# Patient Record
Sex: MALE | Race: WHITE | NOT HISPANIC OR LATINO | Employment: FULL TIME | URBAN - METROPOLITAN AREA
[De-identification: names, ages, dates, MRNs, and addresses within clinical notes are randomized per-mention and may not be internally consistent; named-entity substitution may affect disease eponyms.]

---

## 2017-02-22 ENCOUNTER — OFFICE VISIT - HEALTHEAST (OUTPATIENT)
Dept: FAMILY MEDICINE | Facility: CLINIC | Age: 37
End: 2017-02-22

## 2017-02-22 DIAGNOSIS — F32.1 MAJOR DEPRESSIVE DISORDER, SINGLE EPISODE, MODERATE (H): ICD-10-CM

## 2017-02-22 DIAGNOSIS — E78.00 PURE HYPERCHOLESTEROLEMIA: ICD-10-CM

## 2017-02-22 DIAGNOSIS — M54.32 SCIATICA OF LEFT SIDE: ICD-10-CM

## 2017-02-22 LAB
ALT SERPL W P-5'-P-CCNC: 23 U/L (ref 0–45)
CHOLEST SERPL-MCNC: 203 MG/DL
FASTING STATUS PATIENT QL REPORTED: NO
HDLC SERPL-MCNC: 27 MG/DL

## 2017-02-22 ASSESSMENT — MIFFLIN-ST. JEOR: SCORE: 2174.31

## 2017-02-23 ENCOUNTER — COMMUNICATION - HEALTHEAST (OUTPATIENT)
Dept: FAMILY MEDICINE | Facility: CLINIC | Age: 37
End: 2017-02-23

## 2017-06-26 ENCOUNTER — COMMUNICATION - HEALTHEAST (OUTPATIENT)
Dept: FAMILY MEDICINE | Facility: CLINIC | Age: 37
End: 2017-06-26

## 2017-06-26 DIAGNOSIS — M54.30 SCIATICA: ICD-10-CM

## 2017-08-10 ENCOUNTER — OFFICE VISIT - HEALTHEAST (OUTPATIENT)
Dept: FAMILY MEDICINE | Facility: CLINIC | Age: 37
End: 2017-08-10

## 2017-08-10 DIAGNOSIS — F32.1 MAJOR DEPRESSIVE DISORDER, SINGLE EPISODE, MODERATE (H): ICD-10-CM

## 2017-08-10 RX ORDER — FEXOFENADINE HCL 180 MG/1
180 TABLET ORAL DAILY
Status: SHIPPED | COMMUNITY
Start: 2017-08-10 | End: 2021-12-16

## 2017-08-15 ENCOUNTER — COMMUNICATION - HEALTHEAST (OUTPATIENT)
Dept: FAMILY MEDICINE | Facility: CLINIC | Age: 37
End: 2017-08-15

## 2017-08-16 ENCOUNTER — AMBULATORY - HEALTHEAST (OUTPATIENT)
Dept: FAMILY MEDICINE | Facility: CLINIC | Age: 37
End: 2017-08-16

## 2017-08-16 DIAGNOSIS — M54.16 LUMBAR RADICULOPATHY: ICD-10-CM

## 2017-08-24 ENCOUNTER — COMMUNICATION - HEALTHEAST (OUTPATIENT)
Dept: FAMILY MEDICINE | Facility: CLINIC | Age: 37
End: 2017-08-24

## 2017-08-28 ENCOUNTER — HOSPITAL ENCOUNTER (OUTPATIENT)
Dept: MRI IMAGING | Facility: HOSPITAL | Age: 37
Discharge: HOME | End: 2017-08-28
Attending: FAMILY MEDICINE

## 2017-08-28 DIAGNOSIS — M54.16 LUMBAR RADICULOPATHY: ICD-10-CM

## 2017-08-30 ENCOUNTER — OFFICE VISIT - HEALTHEAST (OUTPATIENT)
Dept: FAMILY MEDICINE | Facility: CLINIC | Age: 37
End: 2017-08-30

## 2017-08-30 DIAGNOSIS — F32.1 MAJOR DEPRESSIVE DISORDER, SINGLE EPISODE, MODERATE (H): ICD-10-CM

## 2017-08-30 DIAGNOSIS — M54.32 SCIATICA OF LEFT SIDE: ICD-10-CM

## 2017-08-30 ASSESSMENT — MIFFLIN-ST. JEOR: SCORE: 2224.2

## 2018-04-03 ENCOUNTER — COMMUNICATION - HEALTHEAST (OUTPATIENT)
Dept: FAMILY MEDICINE | Facility: CLINIC | Age: 38
End: 2018-04-03

## 2018-04-11 ENCOUNTER — OFFICE VISIT - HEALTHEAST (OUTPATIENT)
Dept: FAMILY MEDICINE | Facility: CLINIC | Age: 38
End: 2018-04-11

## 2018-04-11 DIAGNOSIS — F32.1 MAJOR DEPRESSIVE DISORDER, SINGLE EPISODE, MODERATE (H): ICD-10-CM

## 2018-04-11 NOTE — ASSESSMENT & PLAN NOTE
Doing well on 150 mg venlafaxine.  PHQ 9 = 3  Patient's impression is that the medication is helped a lot.  He is happy to stay on it.    This is the first time is been treated with medication.  He has not had recurrent depression but believes that he had symptoms that preceded presentation this time by quite some time.    Continue medication, follow-up 1 year, discussed potential discontinuation of medication.

## 2018-10-02 ENCOUNTER — COMMUNICATION - HEALTHEAST (OUTPATIENT)
Dept: FAMILY MEDICINE | Facility: CLINIC | Age: 38
End: 2018-10-02

## 2019-02-28 ENCOUNTER — COMMUNICATION - HEALTHEAST (OUTPATIENT)
Dept: FAMILY MEDICINE | Facility: CLINIC | Age: 39
End: 2019-02-28

## 2019-03-04 ENCOUNTER — COMMUNICATION - HEALTHEAST (OUTPATIENT)
Dept: FAMILY MEDICINE | Facility: CLINIC | Age: 39
End: 2019-03-04

## 2019-03-13 ENCOUNTER — OFFICE VISIT - HEALTHEAST (OUTPATIENT)
Dept: FAMILY MEDICINE | Facility: CLINIC | Age: 39
End: 2019-03-13

## 2019-03-13 DIAGNOSIS — E66.812 CLASS 2 OBESITY WITHOUT SERIOUS COMORBIDITY WITH BODY MASS INDEX (BMI) OF 39.0 TO 39.9 IN ADULT, UNSPECIFIED OBESITY TYPE: ICD-10-CM

## 2019-03-13 DIAGNOSIS — Z00.00 ROUTINE GENERAL MEDICAL EXAMINATION AT A HEALTH CARE FACILITY: ICD-10-CM

## 2019-03-13 DIAGNOSIS — Z00.00 HEALTHCARE MAINTENANCE: ICD-10-CM

## 2019-03-13 DIAGNOSIS — F32.1 MAJOR DEPRESSIVE DISORDER, SINGLE EPISODE, MODERATE (H): ICD-10-CM

## 2019-03-13 DIAGNOSIS — L91.8 SKIN TAG: ICD-10-CM

## 2019-03-13 DIAGNOSIS — E78.00 PURE HYPERCHOLESTEROLEMIA: ICD-10-CM

## 2019-03-13 LAB
CHOLEST SERPL-MCNC: 245 MG/DL
FASTING STATUS PATIENT QL REPORTED: YES
FASTING STATUS PATIENT QL REPORTED: YES
GLUCOSE BLD-MCNC: 93 MG/DL (ref 70–99)
HDLC SERPL-MCNC: 43 MG/DL
LDLC SERPL CALC-MCNC: 176 MG/DL
TRIGL SERPL-MCNC: 131 MG/DL
TSH SERPL DL<=0.005 MIU/L-ACNC: 1.49 UIU/ML (ref 0.3–5)

## 2019-03-13 ASSESSMENT — MIFFLIN-ST. JEOR: SCORE: 2237.81

## 2019-03-13 NOTE — ASSESSMENT & PLAN NOTE
Unchangedin remission ,  PHQ9= 2  Intervention(s):  Medication?  Venlafaxine 150  Working with therapist?  No    Comment: She has been stable now for 2 years, discussed option of stopping medication.  He has no significant side effects with this and is inclined to continue medication given risk of recurrence.For now, will continue-plan discussion in 1-2 years

## 2019-03-13 NOTE — ASSESSMENT & PLAN NOTE
Briefly discussed, encouraged healthy eating, encouraged 30 minutes/day of exercise.  Patient getting limited exercise, does walk thedog on a daily basis.

## 2019-03-13 NOTE — ASSESSMENT & PLAN NOTE
Giant and stable left groin, atypical, may be subcutaneous lipoma.  Normal epithelium and soft so very low cancer risk.  Not growing.Offered referral for resection, for now follow

## 2019-03-14 ENCOUNTER — COMMUNICATION - HEALTHEAST (OUTPATIENT)
Dept: FAMILY MEDICINE | Facility: CLINIC | Age: 39
End: 2019-03-14

## 2019-03-14 DIAGNOSIS — E78.00 PURE HYPERCHOLESTEROLEMIA: ICD-10-CM

## 2020-03-11 ENCOUNTER — COMMUNICATION - HEALTHEAST (OUTPATIENT)
Dept: FAMILY MEDICINE | Facility: CLINIC | Age: 40
End: 2020-03-11

## 2020-03-11 DIAGNOSIS — F32.1 MAJOR DEPRESSIVE DISORDER, SINGLE EPISODE, MODERATE (H): ICD-10-CM

## 2020-09-17 ENCOUNTER — OFFICE VISIT - HEALTHEAST (OUTPATIENT)
Dept: FAMILY MEDICINE | Facility: CLINIC | Age: 40
End: 2020-09-17

## 2020-09-17 DIAGNOSIS — E66.812 CLASS 2 OBESITY WITHOUT SERIOUS COMORBIDITY WITH BODY MASS INDEX (BMI) OF 39.0 TO 39.9 IN ADULT, UNSPECIFIED OBESITY TYPE: ICD-10-CM

## 2020-09-17 DIAGNOSIS — Z80.0 FAMILY HISTORY OF COLON CANCER: ICD-10-CM

## 2020-09-17 DIAGNOSIS — Z00.00 HEALTHCARE MAINTENANCE: ICD-10-CM

## 2020-09-17 DIAGNOSIS — F32.1 MAJOR DEPRESSIVE DISORDER, SINGLE EPISODE, MODERATE (H): ICD-10-CM

## 2020-09-17 DIAGNOSIS — E78.00 PURE HYPERCHOLESTEROLEMIA: ICD-10-CM

## 2020-09-17 LAB
ALT SERPL W P-5'-P-CCNC: 39 U/L (ref 0–45)
CHOLEST SERPL-MCNC: 279 MG/DL
FASTING STATUS PATIENT QL REPORTED: YES
FASTING STATUS PATIENT QL REPORTED: YES
GLUCOSE BLD-MCNC: 85 MG/DL (ref 70–99)
HDLC SERPL-MCNC: 39 MG/DL
LDLC SERPL CALC-MCNC: 211 MG/DL
TRIGL SERPL-MCNC: 145 MG/DL

## 2020-09-17 ASSESSMENT — MIFFLIN-ST. JEOR: SCORE: 2224.93

## 2020-09-17 ASSESSMENT — PATIENT HEALTH QUESTIONNAIRE - PHQ9: SUM OF ALL RESPONSES TO PHQ QUESTIONS 1-9: 8

## 2020-09-17 NOTE — ASSESSMENT & PLAN NOTE
Unchanged  worsened slightly.  Pandemic stress plus grandfather recently dying, project at work.  Before last few weeks, was having good days and then 2 or 3 bad days a month.  PHQ9= 8  Intervention(s):  Medication?  Venlafaxine 150, initially quite effective  Working with therapist?  No  Other: Patient is trying to exercise regularly    Plan: Increase venlafaxine to 225, follow blood pressure  Follow-up: As needed  In general feels that he is doing fairly well except for the few days that things are not good.  Wife had talked to him about seeing if we could increase dose of medication.

## 2020-09-17 NOTE — ASSESSMENT & PLAN NOTE
Walks with dog twice daily, wife working on improving diet, eating less fast food, weight is stable.

## 2020-09-17 NOTE — ASSESSMENT & PLAN NOTE
Grandfather, before age 40 puts him at higher risk despite being a second-degree relative, referral for colonoscopy

## 2020-09-23 ENCOUNTER — AMBULATORY - HEALTHEAST (OUTPATIENT)
Dept: FAMILY MEDICINE | Facility: CLINIC | Age: 40
End: 2020-09-23

## 2020-09-23 ENCOUNTER — COMMUNICATION - HEALTHEAST (OUTPATIENT)
Dept: FAMILY MEDICINE | Facility: CLINIC | Age: 40
End: 2020-09-23

## 2020-09-23 DIAGNOSIS — E78.00 PURE HYPERCHOLESTEROLEMIA: ICD-10-CM

## 2020-09-25 ENCOUNTER — RECORDS - HEALTHEAST (OUTPATIENT)
Dept: ADMINISTRATIVE | Facility: OTHER | Age: 40
End: 2020-09-25

## 2020-11-14 ENCOUNTER — COMMUNICATION - HEALTHEAST (OUTPATIENT)
Dept: FAMILY MEDICINE | Facility: CLINIC | Age: 40
End: 2020-11-14

## 2020-11-14 DIAGNOSIS — F32.1 MAJOR DEPRESSIVE DISORDER, SINGLE EPISODE, MODERATE (H): ICD-10-CM

## 2020-11-19 ENCOUNTER — COMMUNICATION - HEALTHEAST (OUTPATIENT)
Dept: FAMILY MEDICINE | Facility: CLINIC | Age: 40
End: 2020-11-19

## 2020-11-19 DIAGNOSIS — F32.1 MAJOR DEPRESSIVE DISORDER, SINGLE EPISODE, MODERATE (H): ICD-10-CM

## 2020-11-19 RX ORDER — VENLAFAXINE HYDROCHLORIDE 75 MG/1
225 CAPSULE, EXTENDED RELEASE ORAL DAILY
Qty: 270 CAPSULE | Refills: 3 | Status: SHIPPED | OUTPATIENT
Start: 2020-11-19 | End: 2021-11-10

## 2021-04-28 ENCOUNTER — AMBULATORY - HEALTHEAST (OUTPATIENT)
Dept: NURSING | Facility: CLINIC | Age: 41
End: 2021-04-28

## 2021-05-19 ENCOUNTER — AMBULATORY - HEALTHEAST (OUTPATIENT)
Dept: NURSING | Facility: CLINIC | Age: 41
End: 2021-05-19

## 2021-05-27 ASSESSMENT — PATIENT HEALTH QUESTIONNAIRE - PHQ9: SUM OF ALL RESPONSES TO PHQ QUESTIONS 1-9: 8

## 2021-05-30 VITALS — WEIGHT: 271 LBS | HEIGHT: 72 IN | BODY MASS INDEX: 36.7 KG/M2

## 2021-05-31 VITALS — BODY MASS INDEX: 39.06 KG/M2 | WEIGHT: 284 LBS

## 2021-05-31 VITALS — BODY MASS INDEX: 38.19 KG/M2 | WEIGHT: 282 LBS | HEIGHT: 72 IN

## 2021-06-01 VITALS — WEIGHT: 280 LBS | BODY MASS INDEX: 38.51 KG/M2

## 2021-06-02 VITALS — BODY MASS INDEX: 38.6 KG/M2 | HEIGHT: 72 IN | WEIGHT: 285 LBS

## 2021-06-04 VITALS
RESPIRATION RATE: 14 BRPM | TEMPERATURE: 98.9 F | WEIGHT: 283 LBS | HEIGHT: 71 IN | BODY MASS INDEX: 39.62 KG/M2 | SYSTOLIC BLOOD PRESSURE: 127 MMHG | HEART RATE: 88 BPM | DIASTOLIC BLOOD PRESSURE: 82 MMHG

## 2021-06-06 NOTE — TELEPHONE ENCOUNTER
3/13/19-last visit    , Medication refill requested. Please authorize medication if appropriate. Thank you.

## 2021-06-09 NOTE — PROGRESS NOTES
.  Assessment/ Plan  1. Major depressive disorder, single episode, moderate  Total of over 25 minutes spent with this visit, greater than 50% of this counseling  Reviewed Lexapro 20 mg, will discuss duration of treatment when it has been a year on the medication, this July.  2. Sciatica of left side  Chronic recurrent problem.  L5 radiculopathy per patient.  Then pretty good up until one month ago.  Requesting gabapentin which was effective in the past.  No back pain.  Received epidural steroids in the past which have been effective.  Has done physical therapy  3. Pure hypercholesterolemia  Adjusted diet, recheck  - Cholesterol, Total  - HDL Cholesterol  - ALT (SGPT)      Body mass index is 37.27 kg/(m^2).  The following high BMI interventions were performed this visit: encouragement to exercise  Subjective  CC:  Chief Complaint   Patient presents with     MEDICATION check.     Gabapentin      Back Pain     lower back pain. moves to the back of the thigh     HPI:  Sciatica L leg 2009  Did pt,  Down L leg.   2 surgeries- most recent 2 years ago  2 ESIs  Pain is starting to come back- 1 month, gradual onset.  Moderate severity..    On gabapentin previously- which was helpful.  No SE of medication.  Crusting is now  On 900 tid- then down to 600 tid.    Hyperlipidemia  Patient had high cholesterol when seen in July 2016.  His total cholesterol 260, tricuspid 157, HDL cholesterol 42, .  Dietary recommendations were made  Cutting out red meat- more whole grain  Has not been exercising as much as he would like to.    Major depressive disorder, last seen 8/26/16 and follow-up on Brock. depressive disorder.  Was, at the time of the visit, on 10 mg of Lexapro.  PHQ-9 over the month had gone from 16-8.  Patient did not have side effects.  As a result, increase Lexapro to 20 mg.  He thinks this is helped even more.  Denies any significant side effects  PFSH:  Current medications reviewed as follows:  Current Outpatient  "Prescriptions on File Prior to Visit   Medication Sig     escitalopram oxalate (LEXAPRO) 20 MG tablet Take 1 tablet (20 mg total) by mouth daily.     loratadine-pseudoephedrine (CLARITIN-D 24-HOUR)  mg per 24 hr tablet Take 1 tablet by mouth daily.     melatonin 3 mg Tab tablet Take 9 mg by mouth bedtime as needed.      No current facility-administered medications on file prior to visit.      Patient Active Problem List   Diagnosis     Major depressive disorder, single episode, moderate     Pure hypercholesterolemia     History   Smoking Status     Never Smoker   Smokeless Tobacco     Not on file     Social History     Social History Narrative    , wife is a nurse.  2 children    Going to graduate school at [employer] for library science     Patient Care Team:  Jeremy Townsend MD as PCP - General (Family Medicine)  ROS  Full 10 system review including constitutional, respiratory, cardiac, gi, urinary, rheumatologic, neurologic, reproductive, dermatologic psychiatric is  performed (via questionnaire) and is negative except fatigue, hoarseness, back pain.  Patient is just getting over a cold.      Objective  Physical Exam  Vitals:    02/22/17 1419   BP: 102/72   Patient Site: Left Arm   Patient Position: Sitting   Cuff Size: Adult Large   Pulse: 82   Resp: 20   Temp: 98.6  F (37  C)   TempSrc: Oral   Weight: (!) 271 lb (122.9 kg)   Height: 5' 11.5\" (1.816 m)     PHQ9= 6- patient thinks the majority of positive answers are due to the URI  Pleasant overweight patient, no distress  Back examined, no obvious scoliosis.  No spinous process tenderness.  No pain on palapation of bilateral sacroiliac region.  Negative straight leg raise lateral.  Patient able to walk on toes and heels and step up on step (knee extension) without difficulty.  Reflexes intact and symmetric.    Diagnostics  Cholesterol is pending Please note: Voice recognition software was used in this dictation.  It may therefore contain " typographical errors.

## 2021-06-11 NOTE — PROGRESS NOTES
Adult Male Physical  A/P    Problem List Items Addressed This Visit        Unprioritized    Major depressive disorder, single episode, moderate (H)     Unchanged  worsened slightly.  Pandemic stress plus grandfather recently dying, project at work.  Before last few weeks, was having good days and then 2 or 3 bad days a month.  PHQ9= 8  Intervention(s):  Medication?  Venlafaxine 150, initially quite effective  Working with therapist?  No  Other: Patient is trying to exercise regularly    Plan: Increase venlafaxine to 225, follow blood pressure  Follow-up: As needed  In general feels that he is doing fairly well except for the few days that things are not good.  Wife had talked to him about seeing if we could increase dose of medication.               Pure hypercholesterolemia - Primary     Trying to eat better, not currently on medication.  Check lipid and ALT today         Relevant Orders    Lipid Cascade    ALT (SGPT)    Healthcare maintenance     Flu shot given         Relevant Orders    Glucose (Completed)    Class 2 obesity without serious comorbidity with body mass index (BMI) of 39.0 to 39.9 in adult, unspecified obesity type     Walks with dog twice daily, wife working on improving diet, eating less fast food, weight is stable.         Relevant Orders    Glucose (Completed)    Family history of colon cancer     Grandfather, before age 40 puts him at higher risk despite being a second-degree relative, referral for colonoscopy         Relevant Orders    Ambulatory referral for Colonoscopy              HPI  Current Concerns/ Questions  See discussion above.  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  Walking dog twice a day for exercise, pushes to speed, ends up sweaty and breathing fairly hard when he does this.  Was eating poorly initially on during pandemic, trying to eat better now.  Grandfather recently .  PFSH:  Current medications reviewed as follows:  Current Outpatient Medications on  File Prior to Visit   Medication Sig     fexofenadine (ALLEGRA) 180 MG tablet Take 180 mg by mouth daily.     melatonin 3 mg Tab tablet Take 10 mg by mouth at bedtime as needed.      venlafaxine (EFFEXOR-XR) 75 MG 24 hr capsule TAKE TWO CAPSULES BY MOUTH DAILY      No current facility-administered medications on file prior to visit.      Patient Active Problem List   Diagnosis     Major depressive disorder, single episode, moderate (H)     Pure hypercholesterolemia     Lumbar radiculopathy     Healthcare maintenance     Skin tag     Class 2 obesity without serious comorbidity with body mass index (BMI) of 39.0 to 39.9 in adult, unspecified obesity type     Family history of colon cancer     No past medical history on file.  No past surgical history on file.  Family History   Problem Relation Age of Onset     No Medical Problems Mother      No Medical Problems Father      No Medical Problems Sister      No Medical Problems Brother      Heart disease Maternal Grandmother      Diabetes Maternal Grandmother      Heart disease Paternal Grandmother      Cancer Paternal Grandfather      No Medical Problems Brother      Social History     Tobacco Use   Smoking Status Never Smoker   Smokeless Tobacco Never Used     Other Habits:  Alcohol/ Drug use? no  Social History     Social History Narrative    , wife is a nurse.  2 children    Going to graduate school at [employer]     Immunization History   Administered Date(s) Administered     Hep A, Adult IM (19yr & older) 07/08/2016     Hep B, Adult 07/08/2016     INFLUENZA,SEASONAL QUAD, PF, =/> 6months 03/13/2019, 09/17/2020     Influenza, inj, historic,unspecified 09/21/2013, 09/23/2014, 10/09/2015     Influenza,seasonal,quad inj =/> 6months 10/20/2016     Tdap 11/01/2009, 03/13/2019     Body mass index is 39.18 kg/m .    ROS  Full 10 system review including constitutional, respiratory, cardiac, gi, urinary, rheumatologic, neurologic, reproductive,  "dermatologic psychiatric is  performed  and is otherwise negative       Objective  Physical Exam  Vitals:    09/17/20 0800   BP: 127/82   Patient Site: Right Arm   Patient Position: Sitting   Cuff Size: Adult Large   Pulse: 88   Resp: 14   Temp: 98.9  F (37.2  C)   TempSrc: Oral   Weight: (!) 283 lb (128.4 kg)   Height: 5' 11.26\" (1.81 m)       Gen- alert and oriented x3, no acute distress  HEENT- Normocephalic atraumatic   pupils equal and reactive, EOMI.    TMs visualized and normal, ear canals normal.    Mouth moist with normal mucosa no ulceration, dentition normal or in good repair.  Neck- supple, no adenopathy or thyromegaly  Chest- Normal chest wall apperance, normal inspiration and expiration.  Clear to asculation.  CV- Regular rate and rhythm, normal tones, no murmus, gallops or rubs.  Abd-  Soft, nodistended, nontender.  Normal bowel sounds, no mass or organ enlargement.  Genitalia- normal penis, scrotum, testicles.   Ext- Atraumatic,  No synovial thickening. Good perfusion, no edema. Periph pulses detected  Skin- warm and dry, no rash  Neuro- Cranial nerves grossly intact.  Normal gait, normal strength.  Reflexes symmetric.  Coordination intact.    Diagnostics  Results for orders placed or performed in visit on 09/17/20   Lipid Cascade   Result Value Ref Range    Cholesterol 279 (H) <=199 mg/dL    Triglycerides 145 <=149 mg/dL    HDL Cholesterol 39 (L) >=40 mg/dL    LDL Calculated 211 (H) <=129 mg/dL    Patient Fasting > 8hrs? Yes    Glucose   Result Value Ref Range    Glucose 85 70 - 99 mg/dL    Patient Fasting > 8hrs? Yes    ALT (SGPT)   Result Value Ref Range    ALT 39 0 - 45 U/L                     "

## 2021-06-16 PROBLEM — Z00.00 HEALTHCARE MAINTENANCE: Status: ACTIVE | Noted: 2019-03-13

## 2021-06-16 PROBLEM — L91.8 SKIN TAG: Status: ACTIVE | Noted: 2019-03-13

## 2021-06-16 PROBLEM — E66.812 CLASS 2 OBESITY WITHOUT SERIOUS COMORBIDITY WITH BODY MASS INDEX (BMI) OF 39.0 TO 39.9 IN ADULT, UNSPECIFIED OBESITY TYPE: Status: ACTIVE | Noted: 2019-03-13

## 2021-06-16 PROBLEM — Z80.0 FAMILY HISTORY OF COLON CANCER: Status: ACTIVE | Noted: 2020-09-17

## 2021-06-16 PROBLEM — M54.16 LUMBAR RADICULOPATHY: Status: ACTIVE | Noted: 2017-08-16

## 2021-06-16 PROBLEM — D12.6 COLON ADENOMAS: Status: ACTIVE | Noted: 2020-10-01

## 2021-06-17 NOTE — PROGRESS NOTES
15 minutes spent greater than 50% was counseling regarding following issues    Problem List Items Addressed This Visit        Unprioritized    Major depressive disorder, single episode, moderate - Primary     Doing well on 150 mg venlafaxine.  PHQ 9 = 3  Patient's impression is that the medication is helped a lot.  He is happy to stay on it.    This is the first time is been treated with medication.  He has not had recurrent depression but believes that he had symptoms that preceded presentation this time by quite some time.    Continue medication, follow-up 1 year, discussed potential discontinuation of medication.             DepressionF/U  Narrative: feeling much better  ---------------------  Perception on how things are going: much better- even though still under pressure from school and work- going for second degree + job  PHQ 9= 3; last time I believe is 13  Problematic symptoms: feels tension when taking med later in the day  Symptoms of paranoia or hallucinations? no  Sleeping well? no  Suicidal thoughts? no    Antidepressant medication? Yes/venlafaxine 150  Working with a therapist? No- no time    Anything eslse found helpful? no  Regular exercise? A little- planning to do more when weather better    Current life stressors: work + school

## 2021-06-19 NOTE — LETTER
Letter by Jeremy Townsend MD at      Author: Jeremy Townsend MD Service: -- Author Type: --    Filed:  Encounter Date: 3/14/2019 Status: (Other)       Gavin Jose  [address] [place] [address]              March 14, 2019         Dear Mr. Jose,    Below are the results from your recent visit:    Resulted Orders   Thyroid Stimulating Hormone (TSH)   Result Value Ref Range    TSH 1.49 0.30 - 5.00 uIU/mL   Lipid Cascade   Result Value Ref Range    Cholesterol 245 (H) <=199 mg/dL    Triglycerides 131 <=149 mg/dL    HDL Cholesterol 43 >=40 mg/dL    LDL Calculated 176 (H) <=129 mg/dL    Patient Fasting > 8hrs? Yes    Glucose   Result Value Ref Range    Glucose 93 70 - 99 mg/dL    Patient Fasting > 8hrs? Yes     Narrative    Fasting Glucose reference range is 70-99 mg/dL per  American Diabetes Association (ADA) guidelines.       No sign of diabetes    Cholesterol is quite high.  LDL cholesterol is the type of cholesterol that is most closely linked to development of heart disease.  Your cholesterol is elevated.    At this point, you do not need medication to treat this.  I would work on diet.    I recommend reducing meat fat.    The worst are fats from processed meats like salami, and sausage.  Red meat is pretty bad too.  Pork is somewhere in between.  Chicken is okay especially if you trim off the skin.  Fish is the best type of meat.    Simply reducing the serving size of meats, even if they have a lot of cholesterol, is helpful.    It's good to replace meat with other proteins like nuts and beans and lentils.    Dairy products, even with milk fat seem like they are okay.    We should recheck your cholesterol in 1 year      Please call with questions or contact us using [employer].    Sincerely,        Electronically signed by Jeremy Townsend MD

## 2021-06-20 NOTE — LETTER
Letter by Jeremy Townsend MD at      Author: Jeremy Townsend MD Service: -- Author Type: --    Filed:  Encounter Date: 9/23/2020 Status: (Other)         Gavin Jose  [address] [place] [address]             September 23, 2020         Dear Mr. Jose,    Below are the results from your recent visit:    Resulted Orders   Lipid Cascade   Result Value Ref Range    Cholesterol 279 (H) <=199 mg/dL    Triglycerides 145 <=149 mg/dL    HDL Cholesterol 39 (L) >=40 mg/dL    LDL Calculated 211 (H) <=129 mg/dL    Patient Fasting > 8hrs? Yes    Glucose   Result Value Ref Range    Glucose 85 70 - 99 mg/dL    Patient Fasting > 8hrs? Yes     Narrative    Fasting Glucose reference range is 70-99 mg/dL per  American Diabetes Association (ADA) guidelines.   ALT (SGPT)   Result Value Ref Range    ALT 39 0 - 45 U/L       Saeed, your cholesterol has jumped up quite a bit since the last time it was checked.  Last time your LDL (bad) cholesterol was 176.  Generally, guidelines recommend starting cholesterol-lowering medicine if your LDL is above 190 or your calculated 10-year risk of having a heart attack is above 10%.  Your calculated 10-year risk is low because of your age      I think you should work on your diet rather than starting a cholesterol-lowering medicine right now  I think we should recheck it in 3 months.  Here is my basic recommendations for lowering cholesterol, apologies if I have sent this to you before:    I recommend reducing meat fat.    The worst are fats from processed meats like salami, and sausage.  Red meat is pretty bad too, apparently so is goat meat.  Pork is somewhere in between.  Chicken is okay especially if you trim off the skin.  Fish is the best type of meat.    Simply reducing the serving size of meats, even if they have a lot of cholesterol, is helpful.    It's good to replace meat with other proteins like nuts and beans and lentils.    Dairy products, even with milk fat  seem like they are okay.    We should recheck your cholesterol in 3 months      Please call with questions or contact us using [email or web address].    Sincerely,        Electronically signed by Jeremy Townsend MD

## 2021-06-24 NOTE — TELEPHONE ENCOUNTER
RN cannot approve Refill Request    RN can NOT refill this medication overdue for office visits and/or labs.    Josué Lizama, [facility] Triage/Med Refill 3/5/2019    Requested Prescriptions   Pending Prescriptions Disp Refills     venlafaxine (EFFEXOR-XR) 75 MG 24 hr capsule [Pharmacy Med Name: Venlafaxine HCl ER Oral Capsule Extended Release 24 Hour 75 MG] 60 capsule 3     Sig: TAKE TWO CAPSULES BY MOUTH DAILY    Venlafaxine/Desvenlafaxine Refill Protocol Failed - 3/4/2019 12:48 PM       Failed - LFT or AST or ALT in last year    ALT   Date Value Ref Range Status   02/22/2017 23 0 - 45 U/L Final               Failed - Fasting lipid cascade in last year    Cholesterol   Date Value Ref Range Status   02/22/2017 203 (H) <=199 mg/dL Final     Triglycerides   Date Value Ref Range Status   07/08/2016 157 (H) <=149 mg/dL Final     HDL Cholesterol   Date Value Ref Range Status   02/22/2017 27 (L) >=40 mg/dL Final     LDL Calculated   Date Value Ref Range Status   07/08/2016 187 (H) <=129 mg/dL Final     Patient Fasting > 8hrs?   Date Value Ref Range Status   02/22/2017 No  Final            Passed - PCP or prescribing provider visit in last year    Last office visit with prescriber/PCP: Visit date not found OR same dept: 4/11/2018 Jeremy Townsend MD OR same specialty: 4/11/2018 Jeremy Townsend MD  Last physical: Visit date not found Last MTM visit: Visit date not found   Next visit within 3 mo: Visit date not found  Next physical within 3 mo: Visit date not found  Prescriber OR PCP: Jacquelin Whitman MD  Last diagnosis associated with med order: There are no diagnoses linked to this encounter.  If protocol passes may refill for 12 months if within 3 months of last provider visit (or a total of 15 months).            Passed - Blood Pressure in last year    BP Readings from Last 1 Encounters:   04/11/18 122/76

## 2021-06-24 NOTE — PROGRESS NOTES
Adult Male Physical  A/P    Problem List Items Addressed This Visit        Unprioritized    Major depressive disorder, single episode, moderate (H)     Unchangedin remission ,  PHQ9= 2  Intervention(s):  Medication?  Venlafaxine 150  Working with therapist?  No    Comment: She has been stable now for 2 years, discussed option of stopping medication.  He has no significant side effects with this and is inclined to continue medication given risk of recurrence.  For now, will continue-plan discussion in 1-2 years                 Relevant Medications    venlafaxine (EFFEXOR-XR) 75 MG 24 hr capsule    Other Relevant Orders    Thyroid Stimulating Hormone (TSH)    Pure hypercholesterolemia     Mild, no medication, fasting, lipids ordered         Relevant Orders    Thyroid Stimulating Hormone (TSH)    Lipid Taylor    Healthcare maintenance     Tetanus flu shot given today         Skin tag     Giant and stable left groin, atypical, may be subcutaneous lipoma.  Normal epithelium and soft so very low cancer risk.  Not growing.  Offered referral for resection, for now follow         Class 2 obesity without serious comorbidity with body mass index (BMI) of 39.0 to 39.9 in adult, unspecified obesity type     Briefly discussed, encouraged healthy eating, encouraged 30 minutes/day of exercise.  Patient getting limited exercise, does walk the dog on a daily basis.           Other Visit Diagnoses     Routine general medical examination at a health care facility    -  Primary    Relevant Orders    Lipid Cascade    Glucose (Completed)              HPI  Current Concerns/ Questions  DepressionF/U    ---------------------  Perception on how things are going: well  PHQ 9= 2  Problematic symptoms: none  Symptoms of paranoia or hallucinations? no  Sleeping well? fitful  Suicidal thoughts? no    Antidepressant medication? Yes/venlafaxine  Working with a therapist? no    Anything eslse found helpful? no  None     PFSH:  Current  medications reviewed as follows:  Current Outpatient Medications on File Prior to Visit   Medication Sig     fexofenadine (ALLEGRA) 180 MG tablet Take 180 mg by mouth daily.     gabapentin (NEURONTIN) 300 MG capsule TAKE 1 CAPSULE (300 MG TOTAL) BY MOUTH 3 (THREE) TIMES A DAY.     melatonin 3 mg Tab tablet Take 10 mg by mouth at bedtime as needed.      venlafaxine (EFFEXOR-XR) 75 MG 24 hr capsule TAKE TWO CAPSULES BY MOUTH DAILY      venlafaxine (EFFEXOR-XR) 75 MG 24 hr capsule TAKE TWO CAPSULES BY MOUTH DAILY      No current facility-administered medications on file prior to visit.      Patient Active Problem List   Diagnosis     Major depressive disorder, single episode, moderate (H)     Pure hypercholesterolemia     Lumbar radiculopathy     No past medical history on file.  No past surgical history on file.  Family History   Problem Relation Age of Onset     No Medical Problems Mother      No Medical Problems Father      No Medical Problems Sister      No Medical Problems Brother      Heart disease Maternal Grandmother      Diabetes Maternal Grandmother      Heart disease Paternal Grandmother      Cancer Paternal Grandfather      No Medical Problems Brother      Social History     Tobacco Use   Smoking Status Never Smoker   Smokeless Tobacco Never Used     Other Habits:  Alcohol/ Drug use?no  Social History     Social History Narrative    , wife is a nurse.  2 children    Going to graduate school at [employer]     Immunization History   Administered Date(s) Administered     Hep A, Adult IM (19yr & older) 07/08/2016     Hep B, Adult 07/08/2016     Influenza, inj, historic,unspecified 09/21/2013, 09/23/2014, 10/09/2015     Influenza, seasonal,quad inj 36+ mos 10/20/2016     Tdap 11/01/2009     There is no height or weight on file to calculate BMI.    ROS  Full 10 system review including constitutional, respiratory, cardiac, gi, urinary, rheumatologic, neurologic, reproductive,  dermatologic psychiatric is  performed  and is otherwise negative       Objective  Physical Exam  There were no vitals filed for this visit.  Vital signs filed elsewhere, reviewed  Gen- alert and oriented x3, no acute distress  HEENT- Normocephalic atraumatic   pupils equal and reactive, EOMI.    TMs visualized and normal, ear canals normal.    Mouth moist with normal mucosa no ulceration, dentition normal or in good repair.  Neck- supple, no adenopathy or thyromegaly  Chest- Normal chest wall apperance, normal inspiration and expiration.  Clear to asculation.  CV- Regular rate and rhythm, normal tones, no murmus, gallops or rubs.  Abd-  Soft, nodistended, nontender.  Normal bowel sounds, no mass or organ enlargement.  Ext- Atraumatic,  No synovial thickening. Good perfusion, no edema. Periph pulses detected  Skin- warm and dry, no rash  Neuro- Cranial nerves grossly intact.  Normal gait, normal strength.  Reflexes symmetric.  Coordination intact.    Diagnostics  Results for orders placed or performed in visit on 03/13/19   Thyroid Stimulating Hormone (TSH)   Result Value Ref Range    TSH 1.49 0.30 - 5.00 uIU/mL   Lipid Cascade   Result Value Ref Range    Cholesterol 245 (H) <=199 mg/dL    Triglycerides 131 <=149 mg/dL    HDL Cholesterol 43 >=40 mg/dL    LDL Calculated 176 (H) <=129 mg/dL    Patient Fasting > 8hrs? Yes    Glucose   Result Value Ref Range    Glucose 93 70 - 99 mg/dL    Patient Fasting > 8hrs? Yes

## 2021-06-24 NOTE — TELEPHONE ENCOUNTER
RN cannot approve Refill Request    RN can NOT refill this medication PCP messaged that patient is overdue for Labs.     Gabriela Ortiz, [facility] Triage/Med Refill 2/28/2019    Requested Prescriptions   Pending Prescriptions Disp Refills     venlafaxine (EFFEXOR-XR) 75 MG 24 hr capsule [Pharmacy Med Name: Venlafaxine HCl ER Oral Capsule Extended Release 24 Hour 75 MG] 60 capsule 3     Sig: TAKE TWO CAPSULES BY MOUTH DAILY    Venlafaxine/Desvenlafaxine Refill Protocol Failed - 2/28/2019  5:30 PM       Failed - LFT or AST or ALT in last year    ALT   Date Value Ref Range Status   02/22/2017 23 0 - 45 U/L Final               Failed - Fasting lipid cascade in last year    Cholesterol   Date Value Ref Range Status   02/22/2017 203 (H) <=199 mg/dL Final     Triglycerides   Date Value Ref Range Status   07/08/2016 157 (H) <=149 mg/dL Final     HDL Cholesterol   Date Value Ref Range Status   02/22/2017 27 (L) >=40 mg/dL Final     LDL Calculated   Date Value Ref Range Status   07/08/2016 187 (H) <=129 mg/dL Final     Patient Fasting > 8hrs?   Date Value Ref Range Status   02/22/2017 No  Final            Passed - PCP or prescribing provider visit in last year    Last office visit with prescriber/PCP: Visit date not found OR same dept: 4/11/2018 Jeremy Townsend MD OR same specialty: 4/11/2018 Jeremy Townsend MD  Last physical: Visit date not found Last MTM visit: Visit date not found   Next visit within 3 mo: Visit date not found  Next physical within 3 mo: Visit date not found  Prescriber OR PCP: Jacquelin Whitman MD  Last diagnosis associated with med order: There are no diagnoses linked to this encounter.  If protocol passes may refill for 12 months if within 3 months of last provider visit (or a total of 15 months).            Passed - Blood Pressure in last year    BP Readings from Last 1 Encounters:   04/11/18 122/76

## 2021-06-25 NOTE — PROGRESS NOTES
Progress Notes by Jeremy Townsend MD at 8/10/2017  4:20 PM     Author: Jeremy Townsend MD Service: -- Author Type: Physician    Filed: 8/10/2017  5:36 PM Encounter Date: 8/10/2017 Status: Signed    : Jeremy Townsend MD (Physician)       Over 25 minutes spent greater than 50% of this counseling regarding following issues:  1. Major depressive disorder, single episode, moderate  Some backsliding, doing worse.-No apparent trigger  Requesting change in management.    We will change from Lexapro to venlafaxine  Instructed him to go to 10 mg Lexapro for 5 days then change over.  Warned of some potential mood swings that may occur around this time plus side effects of venlafaxine  Referral to [facility]  Continue to exercise as he is    Aloe up 3-4 weeks  - Ambulatory referral to Psychology    Depression follow-up     Worse for last 2 months  More irritability, worse sleep, just feeling poorly- more fatigued  diificulty falling asleep historic problem so poor sleep is not so trigger    PHQ 9= 13    5 to 9: mild   10 to 14: moderate   15 to 19: moderately severe   ?20: severe     Last score was 8  , relationship strong  Little alcohol  Recently increased exercise but has not helped

## 2021-06-25 NOTE — PROGRESS NOTES
Progress Notes by Jeremy Townsend MD at 8/30/2017  4:20 PM     Author: Jeremy Townsend MD Service: -- Author Type: Physician    Filed: 8/30/2017  5:18 PM Encounter Date: 8/30/2017 Status: Signed    : Jeremy Townsend MD (Physician)       Assessment/ Plan  1. Major depressive disorder, single episode, moderate  Somewhat improved  PHQ 9= 11 the last time 13    5 to 9: mild   10 to 14: moderate   15 to 19: moderately severe   ?20: severe   Increase venlafaxine to 150  Encouraged him to follow-up with therapist.  However, will have a lapse in his insurance.  Pledges to do this afterwards.  Follow-up if not improving    2. Sciatica of left side  As above, lapse in insurance.  Prednisone 50 mg for 5 days  Previous efficacy of epidural steroid injection, referral to pain clinic if not improved after insurance is reinstated  Warned of importance of prompt follow-up should weakness progress  Body mass index is 38.78 kg/(m^2).    Subjective  CC:  Chief Complaint   Patient presents with   ? Follow-up     medication     HPI:  Patient here for follow-up on major depressive disorder.  He was seen 8/10/17, complaining that while his depression had been controlled for quite some time on Lexapro, it had 4 months prior gotten somewhat worse.  He was irritable, down and having difficulty sleeping.  He was referred to a therapist, we stop the Lexapro, started venlafaxine.  He has not had any significant problems with the venlafaxine.  Did have significant mood problems while he was making the switch.  He has had some slight improvement of his mood but not back to the point where he is completely satisfied.    Denies suicidal ideation.  Denies side effects including sexual dysfunction.  Does state that he sleeps better on the medication, thinks it may be sedating.    Second issue is he has a long history of back problems.  He has had 2 surgeries in the past.  Generally gets sciatica left leg.  Last 2 months, he  has had increased symptoms.  Symptoms are pain shooting down the lateral side of the leg.  Initially, was all the way down to his foot.  More recently has been down the lateral side of the leg.  Has noticed some slight weakness but this is very gradual on onset.  Began at the time of the surgery years ago but has gotten somewhat worse lately.  Ontacted our clinic when I was gone last week, obtained an MRI.  Has bowel or bladder dysfunction.  Denies fever chills, nighttime awakening    MRI 8/16  IMPRESSION:   CONCLUSION:  1.  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  2.  No additional neural impingement is identified.  3.  Slight annular bulge with tiny focal annular tear L2-L3.  4.  Mild facet hypertrophy L5-S1.     PFSH:  Current medications reviewed as follows:  Current Outpatient Prescriptions on File Prior to Visit   Medication Sig   ? fexofenadine (ALLEGRA) 180 MG tablet Take 180 mg by mouth daily.   ? gabapentin (NEURONTIN) 300 MG capsule TAKE 1 CAPSULE (300 MG TOTAL) BY MOUTH 3 (THREE) TIMES A DAY.   ? melatonin 3 mg Tab tablet Take 13 mg by mouth at bedtime as needed.    ? [DISCONTINUED] escitalopram oxalate (LEXAPRO) 20 MG tablet Take 1 tablet (20 mg total) by mouth daily.   ? [DISCONTINUED] venlafaxine (EFFEXOR-XR) 75 MG 24 hr capsule Take 1 capsule (75 mg total) by mouth daily.     No current facility-administered medications on file prior to visit.      Patient Active Problem List    Diagnosis Date Noted   ? Lumbar radiculopathy 08/16/2017   ? Pure hypercholesterolemia 08/26/2016   ? Major depressive disorder, single episode, moderate 07/15/2016     History   Smoking Status   ? Never Smoker   Smokeless Tobacco   ? Never Used     Social History     Social History Narrative    , wife is a nurse.  2 children    Going to graduate school at [employer]  [employer]     Patient Care Team:  Jeremy Townsend MD as PCP - General (Family Medicine)  ROS  Full 10 system review including constitutional, respiratory, cardiac, gi, urinary, rheumatologic, neurologic, reproductive, dermatologic psychiatric is  performed (via questionnaire) and is negative      Objective  Physical Exam  Vitals:    08/30/17 1629   BP: 118/72   Pulse: (!) 101   Resp: 20   SpO2: 94%   Weight: (!) 282 lb (127.9 kg)   Height: 5' 11.5\" (1.816 m)   Alert, positive normal affect.  No acute distress  Straight leg raise positive on the left.  Slightly decreased Achilles reflex left side.  Able to walk toes heels without difficulty.    Diagnostics  None    Please note: Voice recognition software was used in this dictation.  It may therefore contain typographical errors.           "

## 2021-06-26 ENCOUNTER — HEALTH MAINTENANCE LETTER (OUTPATIENT)
Age: 41
End: 2021-06-26

## 2021-08-14 ENCOUNTER — TRANSFERRED RECORDS (OUTPATIENT)
Dept: HEALTH INFORMATION MANAGEMENT | Facility: CLINIC | Age: 41
End: 2021-08-14

## 2021-10-16 ENCOUNTER — HEALTH MAINTENANCE LETTER (OUTPATIENT)
Age: 41
End: 2021-10-16

## 2021-11-15 DIAGNOSIS — F32.1 MAJOR DEPRESSIVE DISORDER, SINGLE EPISODE, MODERATE (H): ICD-10-CM

## 2021-11-17 RX ORDER — VENLAFAXINE HYDROCHLORIDE 75 MG/1
225 CAPSULE, EXTENDED RELEASE ORAL DAILY
Qty: 90 CAPSULE | Refills: 0 | Status: SHIPPED | OUTPATIENT
Start: 2021-11-17 | End: 2021-12-08

## 2021-11-17 NOTE — TELEPHONE ENCOUNTER
"Routing refill request to provider for review/approval because:  Vanessa given x1 and patient did not follow up, please advise  Early refill requested.  Patient needs to be seen because it has been more than 1 year since last office visit.    Last Written Prescription Date:  11/10/21  Last Fill Quantity: 90,  # refills: 0   Last office visit provider:  9/17/20     Requested Prescriptions   Pending Prescriptions Disp Refills     venlafaxine (EFFEXOR-XR) 75 MG 24 hr capsule 90 capsule 0     Sig: Take 3 capsules (225 mg) by mouth daily Needs visit prior to future refills.       Serotonin-Norepinephrine Reuptake Inhibitors  Failed - 11/15/2021 11:05 AM        Failed - Blood pressure under 140/90 in past 12 months     BP Readings from Last 3 Encounters:   09/17/20 127/82                 Failed - PHQ-9 score of less than 5 in past 6 months     Please review last PHQ-9 score.           Failed - Normal serum creatinine on file in past 12 months     No lab results found.    Ok to refill medication if creatinine is low          Failed - Recent (6 mo) or future (30 days) visit within the authorizing provider's specialty     Patient had office visit in the last 6 months or has a visit in the next 30 days with authorizing provider or within the authorizing provider's specialty.  See \"Patient Info\" tab in inbasket, or \"Choose Columns\" in Meds & Orders section of the refill encounter.            Passed - Medication is active on med list        Passed - Patient is age 18 or older             Layton Meza RN 11/17/21 8:06 AM  "

## 2021-12-07 DIAGNOSIS — F32.1 MAJOR DEPRESSIVE DISORDER, SINGLE EPISODE, MODERATE (H): ICD-10-CM

## 2021-12-08 RX ORDER — VENLAFAXINE HYDROCHLORIDE 75 MG/1
CAPSULE, EXTENDED RELEASE ORAL
Qty: 90 CAPSULE | Refills: 0 | Status: SHIPPED | OUTPATIENT
Start: 2021-12-08 | End: 2021-12-16

## 2021-12-08 NOTE — TELEPHONE ENCOUNTER
"Routing refill request to provider for review/approval because:  Labs not current:  Creatinine  BP not current  PHQ9 score    Last Written Prescription Date:  11/17/20  Last Fill Quantity: 90,  # refills: 0   Last office visit provider:  9/17/20     Requested Prescriptions   Pending Prescriptions Disp Refills     venlafaxine (EFFEXOR-XR) 75 MG 24 hr capsule [Pharmacy Med Name: Venlafaxine HCl ER Oral Capsule Extended Release 24 Hour 75 MG] 90 capsule 0     Sig: TAKE THREE CAPSULES BY MOUTH DAILY       Serotonin-Norepinephrine Reuptake Inhibitors  Failed - 12/7/2021  8:23 AM        Failed - Blood pressure under 140/90 in past 12 months     BP Readings from Last 3 Encounters:   09/17/20 127/82                 Failed - PHQ-9 score of less than 5 in past 6 months     Please review last PHQ-9 score.           Failed - Normal serum creatinine on file in past 12 months     No lab results found.    Ok to refill medication if creatinine is low          Passed - Medication is active on med list        Passed - Patient is age 18 or older        Passed - Recent (6 mo) or future (30 days) visit within the authorizing provider's specialty     Patient had office visit in the last 6 months or has a visit in the next 30 days with authorizing provider or within the authorizing provider's specialty.  See \"Patient Info\" tab in inbasket, or \"Choose Columns\" in Meds & Orders section of the refill encounter.                 Bindu Jasmine RN 12/08/21 10:08 AM  "

## 2021-12-11 ENCOUNTER — HEALTH MAINTENANCE LETTER (OUTPATIENT)
Age: 41
End: 2021-12-11

## 2021-12-16 ENCOUNTER — OFFICE VISIT (OUTPATIENT)
Dept: FAMILY MEDICINE | Facility: CLINIC | Age: 41
End: 2021-12-16
Payer: COMMERCIAL

## 2021-12-16 VITALS
DIASTOLIC BLOOD PRESSURE: 86 MMHG | BODY MASS INDEX: 39.62 KG/M2 | WEIGHT: 283 LBS | HEART RATE: 91 BPM | SYSTOLIC BLOOD PRESSURE: 136 MMHG | HEIGHT: 71 IN | OXYGEN SATURATION: 99 % | RESPIRATION RATE: 16 BRPM

## 2021-12-16 DIAGNOSIS — F32.1 MAJOR DEPRESSIVE DISORDER, SINGLE EPISODE, MODERATE (H): ICD-10-CM

## 2021-12-16 DIAGNOSIS — E66.812 CLASS 2 OBESITY WITHOUT SERIOUS COMORBIDITY WITH BODY MASS INDEX (BMI) OF 39.0 TO 39.9 IN ADULT, UNSPECIFIED OBESITY TYPE: ICD-10-CM

## 2021-12-16 DIAGNOSIS — Z00.00 HEALTHCARE MAINTENANCE: ICD-10-CM

## 2021-12-16 DIAGNOSIS — E78.00 PURE HYPERCHOLESTEROLEMIA: Primary | ICD-10-CM

## 2021-12-16 DIAGNOSIS — D12.6 COLON ADENOMAS: ICD-10-CM

## 2021-12-16 DIAGNOSIS — E66.01 MORBID OBESITY (H): ICD-10-CM

## 2021-12-16 LAB
ALT SERPL W P-5'-P-CCNC: 33 U/L (ref 0–45)
CHOLEST SERPL-MCNC: 242 MG/DL
FASTING STATUS PATIENT QL REPORTED: YES
GLUCOSE BLD-MCNC: 86 MG/DL (ref 60–99)
HDLC SERPL-MCNC: 47 MG/DL
LDLC SERPL CALC-MCNC: 165 MG/DL
TRIGL SERPL-MCNC: 151 MG/DL

## 2021-12-16 PROCEDURE — 84460 ALANINE AMINO (ALT) (SGPT): CPT | Performed by: FAMILY MEDICINE

## 2021-12-16 PROCEDURE — 82947 ASSAY GLUCOSE BLOOD QUANT: CPT | Performed by: FAMILY MEDICINE

## 2021-12-16 PROCEDURE — 80061 LIPID PANEL: CPT | Performed by: FAMILY MEDICINE

## 2021-12-16 PROCEDURE — 99396 PREV VISIT EST AGE 40-64: CPT | Mod: 25 | Performed by: FAMILY MEDICINE

## 2021-12-16 PROCEDURE — 0004A PR COVID VAC PFIZER DIL RECON 30 MCG/0.3 ML IM: CPT | Performed by: FAMILY MEDICINE

## 2021-12-16 PROCEDURE — 36415 COLL VENOUS BLD VENIPUNCTURE: CPT | Performed by: FAMILY MEDICINE

## 2021-12-16 PROCEDURE — 91300 PR COVID VAC PFIZER DIL RECON 30 MCG/0.3 ML IM: CPT | Performed by: FAMILY MEDICINE

## 2021-12-16 RX ORDER — VENLAFAXINE HYDROCHLORIDE 75 MG/1
225 CAPSULE, EXTENDED RELEASE ORAL DAILY
Qty: 270 CAPSULE | Refills: 0
Start: 2021-12-16 | End: 2022-02-14

## 2021-12-16 ASSESSMENT — PATIENT HEALTH QUESTIONNAIRE - PHQ9: SUM OF ALL RESPONSES TO PHQ QUESTIONS 1-9: 3

## 2021-12-16 ASSESSMENT — MIFFLIN-ST. JEOR: SCORE: 2213.68

## 2021-12-16 NOTE — PROGRESS NOTES
Adult Male Physical  A/P  Major depressive disorder, single episode, moderate (H)  Doing well, PHQ-9 is 3  Feels like high-dose venlafaxine is very effective, previous medicines not very effective.  No changes, intends to stay on this long-term even though depression not recurrent but was persistent.  No major side effects.    Pure hypercholesterolemia  Very high LDL September 2021.  , low calculated 10-year risk.  Recheck lipid.  Discussed use of statin medication    Healthcare maintenance  Covid booster given, has had flu shot.    Class 2 obesity without serious comorbidity with body mass index (BMI) of 39.0 to 39.9 in adult, unspecified obesity type  Encouraged healthy eating, regular exercise.    Colon adenomas  Due for follow-up for adenomas 2023    Morbid obesity (H)  Exercise, diet           HPI  Current Concerns/ Questions  Saeed presents today for yearly checkup.  No new questions.  Very high cholesterol last year, .  Is worked on diet.  Has not lost weight but is also working on exercise.    Indicates that venlafaxine has been very helpful with mood.  Prior to that, was on SSRI which was not helpful.  Mood was up and down.  Family is happy that he is on the medication.  PFSH:  Current medications reviewed as follows:  melatonin 3 mg Tab tablet, [MELATONIN 3 MG TAB TABLET] Take 10 mg by mouth at bedtime as needed.   fexofenadine (ALLEGRA) 180 MG tablet, [FEXOFENADINE (ALLEGRA) 180 MG TABLET] Take 180 mg by mouth daily.    No current facility-administered medications on file prior to visit.     Patient Active Problem List   Diagnosis     Major depressive disorder, single episode, moderate (H)     Pure hypercholesterolemia     Lumbar radiculopathy     Healthcare maintenance     Skin tag     Class 2 obesity without serious comorbidity with body mass index (BMI) of 39.0 to 39.9 in adult, unspecified obesity type     Family history of colon cancer     Colon adenomas     Morbid obesity (H)  "    History reviewed. No pertinent past medical history.  History reviewed. No pertinent surgical history.  Family History   Problem Relation Age of Onset     No Known Problems Mother      No Known Problems Father      No Known Problems Sister      No Known Problems Brother      Heart Disease Maternal Grandmother      Diabetes Maternal Grandmother      Heart Disease Paternal Grandmother      Cancer Paternal Grandfather      No Known Problems Brother      History   Smoking Status     Never Smoker   Smokeless Tobacco     Never Used       Social History     Social History Narrative    , wife is a nurse.  2 children  Going to graduate school at [employer]     Immunization History   Administered Date(s) Administered     COVID-19,PF,Pfizer (12+ Yrs) 04/28/2021, 05/19/2021, 12/16/2021     Flu, Unspecified 09/21/2013, 09/23/2014, 10/09/2015     HepA-Adult 07/08/2016     HepB-Adult 07/08/2016     Influenza Vaccine IM > 6 months Valent IIV4 (Alfuria,Fluzone) 03/13/2019, 09/17/2020     Influenza Vaccine, 6+MO IM (QUADRIVALENT W/PRESERVATIVES) 10/20/2016     Tdap (Adacel,Boostrix) 11/01/2009, 03/13/2019     Body mass index is 39.62 kg/m .    ROS  As above    Objective  Physical Exam  Vitals:    12/16/21 0931   BP: 136/86   BP Location: Left arm   Patient Position: Sitting   Cuff Size: Adult Large   Pulse: 91   Resp: 16   SpO2: 99%   Weight: 128.4 kg (283 lb)   Height: 1.8 m (5' 10.87\")       Gen- alert and oriented x3, no acute distress, obese  HEENT- Normocephalic atraumatic   pupils equal and reactive, EOMI.    TMs visualized and normal, ear canals normal.    Mouth moist with normal mucosa no ulceration, dentition normal or in good repair.  Neck- supple, no adenopathy or thyromegaly  Chest- Normal chest wall apperance, normal inspiration and expiration.  Clear to asculation.  CV- Regular rate and rhythm, normal tones, no murmus, gallops or rubs.  Abd-  Soft, nodistended, nontender.  Normal " bowel sounds, no mass or organ enlargement.    Ext- Atraumatic,  No synovial thickening. Good perfusion, no edema. Periph pulses detected  Skin- warm and dry, no rash  Neuro- Cranial nerves grossly intact.  Normal gait, normal strength.  Reflexes symmetric.  Coordination intact.    Diagnostics  Results for orders placed or performed in visit on 12/16/21   Lipid Profile     Status: Abnormal   Result Value Ref Range    Cholesterol 242 (H) <=199 mg/dL    Triglycerides 151 (H) <=149 mg/dL    Direct Measure HDL 47 >=40 mg/dL    LDL Cholesterol Calculated 165 (H) <=129 mg/dL    Patient Fasting > 8hrs? Yes    ALT     Status: Normal   Result Value Ref Range    ALT 33 0 - 45 U/L   Glucose whole blood     Status: Normal   Result Value Ref Range    Glucose Whole Blood 86 60 - 99 mg/dL                     Answers for HPI/ROS submitted by the patient on 12/9/2021  Frequency of exercise:: 2-3 days/week  Getting at least 3 servings of Calcium per day:: Yes  Diet:: Regular (no restrictions)  Taking medications regularly:: Yes  Medication side effects:: None  Bi-annual eye exam:: Yes  Dental care twice a year:: Yes  Sleep apnea or symptoms of sleep apnea:: None  Additional concerns today:: No  Duration of exercise:: 15-30 minutes

## 2021-12-16 NOTE — ASSESSMENT & PLAN NOTE
Doing well, PHQ-9 is 3  Feels like high-dose venlafaxine is very effective, previous medicines not very effective.  No changes, intends to stay on this long-term even though depression not recurrent but was persistent.  No major side effects.

## 2021-12-16 NOTE — ASSESSMENT & PLAN NOTE
Very high LDL September 2021.  , low calculated 10-year risk.  Recheck lipid.  Discussed use of statin medication

## 2022-02-11 ENCOUNTER — MYC REFILL (OUTPATIENT)
Dept: FAMILY MEDICINE | Facility: CLINIC | Age: 42
End: 2022-02-11
Payer: COMMERCIAL

## 2022-02-11 DIAGNOSIS — F32.1 MAJOR DEPRESSIVE DISORDER, SINGLE EPISODE, MODERATE (H): ICD-10-CM

## 2022-02-14 ENCOUNTER — MYC REFILL (OUTPATIENT)
Dept: FAMILY MEDICINE | Facility: CLINIC | Age: 42
End: 2022-02-14
Payer: COMMERCIAL

## 2022-02-14 DIAGNOSIS — F32.1 MAJOR DEPRESSIVE DISORDER, SINGLE EPISODE, MODERATE (H): ICD-10-CM

## 2022-02-14 RX ORDER — VENLAFAXINE HYDROCHLORIDE 75 MG/1
225 CAPSULE, EXTENDED RELEASE ORAL DAILY
Qty: 270 CAPSULE | Refills: 0 | Status: SHIPPED | OUTPATIENT
Start: 2022-02-14 | End: 2022-05-11

## 2022-02-14 NOTE — TELEPHONE ENCOUNTER
Pt calling, he is offically out as of today, please send refill to [facility] in [address] ASAP.

## 2022-02-15 RX ORDER — VENLAFAXINE HYDROCHLORIDE 75 MG/1
225 CAPSULE, EXTENDED RELEASE ORAL DAILY
Qty: 270 CAPSULE | Refills: 0 | OUTPATIENT
Start: 2022-02-15

## 2022-02-15 NOTE — TELEPHONE ENCOUNTER
Refused refill request as duplicate. Filled 2/14/2022  Liliana Chavez, RN   02/15/22 8:29 AM  [facility] Nurse Advisor

## 2022-05-09 DIAGNOSIS — F32.1 MAJOR DEPRESSIVE DISORDER, SINGLE EPISODE, MODERATE (H): ICD-10-CM

## 2022-05-09 RX ORDER — MELOXICAM 15 MG/1
TABLET ORAL
COMMUNITY
Start: 2022-01-27 | End: 2023-02-22

## 2022-05-11 RX ORDER — VENLAFAXINE HYDROCHLORIDE 75 MG/1
225 CAPSULE, EXTENDED RELEASE ORAL DAILY
Qty: 270 CAPSULE | Refills: 0 | Status: SHIPPED | OUTPATIENT
Start: 2022-05-11 | End: 2022-08-09

## 2022-05-11 NOTE — TELEPHONE ENCOUNTER
"Routing refill request to provider for review/approval because:  Labs not current:        Last Written Prescription Date:  2/14/22  Last Fill Quantity: 270,  # refills: 0   Last office visit provider:  12/16/21     Requested Prescriptions   Pending Prescriptions Disp Refills     venlafaxine (EFFEXOR XR) 75 MG 24 hr capsule 270 capsule 0     Sig: Take 3 capsules (225 mg) by mouth daily       Serotonin-Norepinephrine Reuptake Inhibitors  Failed - 5/9/2022  4:07 PM        Failed - Normal serum creatinine on file in past 12 months     No lab results found.    Ok to refill medication if creatinine is low          Passed - Blood pressure under 140/90 in past 12 months     BP Readings from Last 3 Encounters:   12/16/21 136/86   09/17/20 127/82                 Passed - PHQ-9 score of less than 5 in past 6 months     Please review last PHQ-9 score.           Passed - Medication is active on med list        Passed - Patient is age 18 or older        Passed - Recent (6 mo) or future (30 days) visit within the authorizing provider's specialty     Patient had office visit in the last 6 months or has a visit in the next 30 days with authorizing provider or within the authorizing provider's specialty.  See \"Patient Info\" tab in inbasket, or \"Choose Columns\" in Meds & Orders section of the refill encounter.             Signed Prescriptions Disp Refills    meloxicam (MOBIC) 15 MG tablet         There is no refill protocol information for this order          Gabriela Ortiz RN 05/11/22 4:57 PM  " 22-Nov-2019

## 2022-08-08 DIAGNOSIS — F32.1 MAJOR DEPRESSIVE DISORDER, SINGLE EPISODE, MODERATE (H): ICD-10-CM

## 2022-08-08 NOTE — TELEPHONE ENCOUNTER
Pending Prescriptions:                       Disp   Refills    venlafaxine (EFFEXOR XR) 75 MG 24 hr caps*270 ca*0            Sig: Take 3 capsules (225 mg) by mouth daily

## 2022-08-09 ENCOUNTER — MYC REFILL (OUTPATIENT)
Dept: FAMILY MEDICINE | Facility: CLINIC | Age: 42
End: 2022-08-09

## 2022-08-09 DIAGNOSIS — F32.1 MAJOR DEPRESSIVE DISORDER, SINGLE EPISODE, MODERATE (H): ICD-10-CM

## 2022-08-09 RX ORDER — VENLAFAXINE HYDROCHLORIDE 75 MG/1
225 CAPSULE, EXTENDED RELEASE ORAL DAILY
Qty: 270 CAPSULE | Refills: 0 | Status: SHIPPED | OUTPATIENT
Start: 2022-08-09 | End: 2022-11-10

## 2022-08-09 NOTE — TELEPHONE ENCOUNTER
"Routing refill request to provider for review/approval because:  Labs not current:  Cr phq 9    Last Written Prescription Date:  5/11/22  Last Fill Quantity: 270,  # refills: 0   Last office visit provider:  12/16/21     Requested Prescriptions   Pending Prescriptions Disp Refills     venlafaxine (EFFEXOR XR) 75 MG 24 hr capsule 270 capsule 0     Sig: Take 3 capsules (225 mg) by mouth daily       Serotonin-Norepinephrine Reuptake Inhibitors  Failed - 8/8/2022  3:40 PM        Failed - PHQ-9 score of less than 5 in past 6 months     Please review last PHQ-9 score.           Failed - Normal serum creatinine on file in past 12 months     No lab results found.    Ok to refill medication if creatinine is low          Failed - Recent (6 mo) or future (30 days) visit within the authorizing provider's specialty     Patient had office visit in the last 6 months or has a visit in the next 30 days with authorizing provider or within the authorizing provider's specialty.  See \"Patient Info\" tab in inbasket, or \"Choose Columns\" in Meds & Orders section of the refill encounter.            Passed - Blood pressure under 140/90 in past 12 months     BP Readings from Last 3 Encounters:   12/16/21 136/86   09/17/20 127/82                 Passed - Medication is active on med list        Passed - Patient is age 18 or older             Gabriela Ortiz RN 08/08/22 8:17 PM  "

## 2022-08-10 RX ORDER — VENLAFAXINE HYDROCHLORIDE 75 MG/1
225 CAPSULE, EXTENDED RELEASE ORAL DAILY
Qty: 270 CAPSULE | Refills: 0 | OUTPATIENT
Start: 2022-08-10

## 2022-10-01 ENCOUNTER — HEALTH MAINTENANCE LETTER (OUTPATIENT)
Age: 42
End: 2022-10-01

## 2022-11-08 ENCOUNTER — MYC REFILL (OUTPATIENT)
Dept: FAMILY MEDICINE | Facility: CLINIC | Age: 42
End: 2022-11-08

## 2022-11-08 DIAGNOSIS — F32.1 MAJOR DEPRESSIVE DISORDER, SINGLE EPISODE, MODERATE (H): ICD-10-CM

## 2022-11-09 ENCOUNTER — MYC REFILL (OUTPATIENT)
Dept: FAMILY MEDICINE | Facility: CLINIC | Age: 42
End: 2022-11-09

## 2022-11-09 DIAGNOSIS — F32.1 MAJOR DEPRESSIVE DISORDER, SINGLE EPISODE, MODERATE (H): ICD-10-CM

## 2022-11-10 ENCOUNTER — MYC REFILL (OUTPATIENT)
Dept: FAMILY MEDICINE | Facility: CLINIC | Age: 42
End: 2022-11-10

## 2022-11-10 DIAGNOSIS — F32.1 MAJOR DEPRESSIVE DISORDER, SINGLE EPISODE, MODERATE (H): ICD-10-CM

## 2022-11-10 RX ORDER — VENLAFAXINE HYDROCHLORIDE 75 MG/1
225 CAPSULE, EXTENDED RELEASE ORAL DAILY
Qty: 270 CAPSULE | Refills: 0 | Status: SHIPPED | OUTPATIENT
Start: 2022-11-10 | End: 2023-01-25

## 2022-11-10 RX ORDER — VENLAFAXINE HYDROCHLORIDE 75 MG/1
225 CAPSULE, EXTENDED RELEASE ORAL DAILY
Qty: 270 CAPSULE | Refills: 0 | Status: SHIPPED | OUTPATIENT
Start: 2022-11-10 | End: 2023-02-22

## 2022-11-10 NOTE — TELEPHONE ENCOUNTER
"Routing refill request to provider for review/approval because:  Labs not current:  Cr phq 9     Last Written Prescription Date:  8/9/22  Last Fill Quantity: 270,  # refills: 0   Last office visit provider:  12/16/21     Requested Prescriptions   Pending Prescriptions Disp Refills     venlafaxine (EFFEXOR XR) 75 MG 24 hr capsule 270 capsule 0     Sig: Take 3 capsules (225 mg) by mouth daily       Serotonin-Norepinephrine Reuptake Inhibitors  Failed - 11/8/2022  1:48 PM        Failed - PHQ-9 score of less than 5 in past 6 months     Please review last PHQ-9 score.           Failed - Normal serum creatinine on file in past 12 months     No lab results found.    Ok to refill medication if creatinine is low          Failed - Recent (6 mo) or future (30 days) visit within the authorizing provider's specialty     Patient had office visit in the last 6 months or has a visit in the next 30 days with authorizing provider or within the authorizing provider's specialty.  See \"Patient Info\" tab in inbasket, or \"Choose Columns\" in Meds & Orders section of the refill encounter.            Passed - Blood pressure under 140/90 in past 12 months     BP Readings from Last 3 Encounters:   12/16/21 136/86   09/17/20 127/82                 Passed - Medication is active on med list        Passed - Patient is age 18 or older             Gabriela Ortiz RN 11/10/22 4:04 PM  "

## 2022-11-10 NOTE — TELEPHONE ENCOUNTER
Pt calling back and needing the refill as he is completely out of it. Please send RX to pharmacy on file. Thanks!

## 2022-11-11 RX ORDER — VENLAFAXINE HYDROCHLORIDE 75 MG/1
225 CAPSULE, EXTENDED RELEASE ORAL DAILY
Qty: 270 CAPSULE | Refills: 0 | OUTPATIENT
Start: 2022-11-11

## 2022-12-22 PROBLEM — L91.8 SKIN TAG: Status: RESOLVED | Noted: 2019-03-13 | Resolved: 2022-12-22

## 2022-12-22 PROBLEM — E66.812 CLASS 2 OBESITY WITHOUT SERIOUS COMORBIDITY WITH BODY MASS INDEX (BMI) OF 39.0 TO 39.9 IN ADULT, UNSPECIFIED OBESITY TYPE: Status: RESOLVED | Noted: 2019-03-13 | Resolved: 2022-12-22

## 2023-01-25 DIAGNOSIS — F32.1 MAJOR DEPRESSIVE DISORDER, SINGLE EPISODE, MODERATE (H): ICD-10-CM

## 2023-01-25 RX ORDER — VENLAFAXINE HYDROCHLORIDE 75 MG/1
CAPSULE, EXTENDED RELEASE ORAL
Qty: 270 CAPSULE | Refills: 0 | Status: SHIPPED | OUTPATIENT
Start: 2023-01-25 | End: 2023-05-01

## 2023-01-25 NOTE — TELEPHONE ENCOUNTER
"Routing refill request to provider for review/approval because:  bp out of range, phq 9  Due to be seen    Last Written Prescription Date:  11/10/22  Last Fill Quantity: 270,  # refills: 0   Last office visit provider:  12/16/21     Requested Prescriptions   Pending Prescriptions Disp Refills     venlafaxine (EFFEXOR XR) 75 MG 24 hr capsule [Pharmacy Med Name: Venlafaxine HCl ER Oral Capsule Extended Release 24 Hour 75 MG] 270 capsule 0     Sig: TAKE THREE CAPSULES BY MOUTH DAILY       Serotonin-Norepinephrine Reuptake Inhibitors  Failed - 1/25/2023  7:42 AM        Failed - Blood pressure under 140/90 in past 12 months     BP Readings from Last 3 Encounters:   12/16/21 136/86   09/17/20 127/82                 Failed - PHQ-9 score of less than 5 in past 6 months     Please review last PHQ-9 score.           Failed - Normal serum creatinine on file in past 12 months     No lab results found.    Ok to refill medication if creatinine is low          Passed - Medication is active on med list        Passed - Patient is age 18 or older        Passed - Recent (6 mo) or future (30 days) visit within the authorizing provider's specialty     Patient had office visit in the last 6 months or has a visit in the next 30 days with authorizing provider or within the authorizing provider's specialty.  See \"Patient Info\" tab in inbasket, or \"Choose Columns\" in Meds & Orders section of the refill encounter.                 Gabriela Ortiz RN 01/25/23 5:56 PM  "

## 2023-02-04 ENCOUNTER — HEALTH MAINTENANCE LETTER (OUTPATIENT)
Age: 43
End: 2023-02-04

## 2023-02-15 ASSESSMENT — ENCOUNTER SYMPTOMS
ABDOMINAL PAIN: 0
PALPITATIONS: 0
ARTHRALGIAS: 0
JOINT SWELLING: 0
HEMATURIA: 0
PARESTHESIAS: 0
DYSURIA: 0
DIZZINESS: 0
FREQUENCY: 0
COUGH: 0
SORE THROAT: 0
NERVOUS/ANXIOUS: 0
CONSTIPATION: 0
HEARTBURN: 0
WEAKNESS: 0
FEVER: 0
CHILLS: 0
HEADACHES: 0
HEMATOCHEZIA: 0
SHORTNESS OF BREATH: 0
NAUSEA: 0
EYE PAIN: 0
MYALGIAS: 0
DIARRHEA: 0

## 2023-02-22 ENCOUNTER — OFFICE VISIT (OUTPATIENT)
Dept: FAMILY MEDICINE | Facility: CLINIC | Age: 43
End: 2023-02-22
Payer: COMMERCIAL

## 2023-02-22 VITALS
WEIGHT: 286 LBS | RESPIRATION RATE: 16 BRPM | BODY MASS INDEX: 40.04 KG/M2 | TEMPERATURE: 98.2 F | SYSTOLIC BLOOD PRESSURE: 131 MMHG | DIASTOLIC BLOOD PRESSURE: 89 MMHG | OXYGEN SATURATION: 97 % | HEIGHT: 71 IN | HEART RATE: 82 BPM

## 2023-02-22 DIAGNOSIS — Z00.00 HEALTHCARE MAINTENANCE: Primary | ICD-10-CM

## 2023-02-22 DIAGNOSIS — D12.6 COLON ADENOMAS: ICD-10-CM

## 2023-02-22 DIAGNOSIS — E66.01 MORBID OBESITY (H): ICD-10-CM

## 2023-02-22 DIAGNOSIS — F32.1 MAJOR DEPRESSIVE DISORDER, SINGLE EPISODE, MODERATE (H): ICD-10-CM

## 2023-02-22 DIAGNOSIS — E78.00 PURE HYPERCHOLESTEROLEMIA: ICD-10-CM

## 2023-02-22 LAB
ALT SERPL W P-5'-P-CCNC: 30 U/L (ref 10–50)
CHOLEST SERPL-MCNC: 258 MG/DL
GLUCOSE BLD-MCNC: 91 MG/DL (ref 60–99)
HDLC SERPL-MCNC: 45 MG/DL
LDLC SERPL CALC-MCNC: 186 MG/DL
NONHDLC SERPL-MCNC: 213 MG/DL
TRIGL SERPL-MCNC: 137 MG/DL

## 2023-02-22 PROCEDURE — 36415 COLL VENOUS BLD VENIPUNCTURE: CPT | Performed by: FAMILY MEDICINE

## 2023-02-22 PROCEDURE — 84460 ALANINE AMINO (ALT) (SGPT): CPT | Performed by: FAMILY MEDICINE

## 2023-02-22 PROCEDURE — 99396 PREV VISIT EST AGE 40-64: CPT | Performed by: FAMILY MEDICINE

## 2023-02-22 PROCEDURE — 82947 ASSAY GLUCOSE BLOOD QUANT: CPT | Performed by: FAMILY MEDICINE

## 2023-02-22 PROCEDURE — 80061 LIPID PANEL: CPT | Performed by: FAMILY MEDICINE

## 2023-02-22 ASSESSMENT — PATIENT HEALTH QUESTIONNAIRE - PHQ9
10. IF YOU CHECKED OFF ANY PROBLEMS, HOW DIFFICULT HAVE THESE PROBLEMS MADE IT FOR YOU TO DO YOUR WORK, TAKE CARE OF THINGS AT HOME, OR GET ALONG WITH OTHER PEOPLE: NOT DIFFICULT AT ALL
SUM OF ALL RESPONSES TO PHQ QUESTIONS 1-9: 3
SUM OF ALL RESPONSES TO PHQ QUESTIONS 1-9: 3

## 2023-02-22 NOTE — PROGRESS NOTES
Adult Male Physical  A/P  Pure hypercholesterolemia  Check lipid and ALT today.  Working hard on diet.    Major depressive disorder, single episode, moderate (H)  Doing well, wishes to continue venlafaxine indefinitely, PHQ-9 is 3    Healthcare maintenance  Declines COVID booster    Colon adenomas  Will be due for follow-up colonoscopy this September.  He believes he will be contacted, asked him to contact us if he has not.           HPI  Current Concerns/ Questions  No new concerns  PFSH:  Current medications reviewed as follows:  venlafaxine (EFFEXOR XR) 75 MG 24 hr capsule, TAKE THREE CAPSULES BY MOUTH DAILY    No current facility-administered medications on file prior to visit.     Patient Active Problem List   Diagnosis     Major depressive disorder, single episode, moderate (H)     Pure hypercholesterolemia     Lumbar radiculopathy     Healthcare maintenance     Family history of colon cancer     Colon adenomas     Morbid obesity (H)     Vitamin D deficiency     No past medical history on file.  No past surgical history on file.  Family History   Problem Relation Age of Onset     No Known Problems Mother      No Known Problems Father      No Known Problems Sister      No Known Problems Brother      Heart Disease Maternal Grandmother      Diabetes Maternal Grandmother      Heart Disease Paternal Grandmother      Cancer Paternal Grandfather      No Known Problems Brother      History   Smoking Status     Never   Smokeless Tobacco     Never       Social History     Social History Narrative    , wife is a nurse.  2 children  Going to graduate school at [employer]     Immunization History   Administered Date(s) Administered     COVID-19 Vaccine 12+ (Pfizer) 04/28/2021, 05/19/2021, 12/16/2021     Flu, Unspecified 09/21/2013, 09/23/2014, 10/09/2015, 10/03/2022     HepA-Adult 07/08/2016     HepB-Adult 07/08/2016     Influenza Vaccine >6 months (Alfuria,Fluzone) 03/13/2019, 09/17/2020,  "10/04/2021     Influenza Vaccine, 6+MO IM (QUADRIVALENT W/PRESERVATIVES) 10/20/2016     Tdap (Adacel,Boostrix) 11/01/2009, 03/13/2019     Body mass index is 39.6 kg/m .      Objective  Physical Exam  Vitals:    02/22/23 0953   BP: 131/89   BP Location: Right arm   Patient Position: Sitting   Cuff Size: Adult Large   Pulse: 82   Resp: 16   Temp: 98.2  F (36.8  C)   TempSrc: Temporal   SpO2: 97%   Weight: 129.7 kg (286 lb)   Height: 1.81 m (5' 11.26\")       Gen- alert and oriented x3, no acute distress  HEENT- Normocephalic atraumatic   pupils equal and reactive, EOMI.    TMs visualized and normal, ear canals normal.    Mouth moist with normal mucosa no ulceration, dentition normal or in good repair.  Neck- supple, no adenopathy or thyromegaly  Chest- Normal chest wall apperance, normal inspiration and expiration.  Clear to asculation.  CV- Regular rate and rhythm, normal tones, no murmus, gallops or rubs.  Abd-  Soft, nodistended, nontender.  Normal bowel sounds, no mass or organ enlargement.    Ext- Atraumatic,  No synovial thickening. Good perfusion, no edema. Periph pulses detected  Skin- warm and dry, no rash  Neuro- Cranial nerves grossly intact.  Normal gait, normal strength.  Reflexes symmetric.  Coordination intact.    Diagnostics  Results for orders placed or performed in visit on 02/22/23   Lipid Profile (Chol, Trig, HDL, LDL calc)     Status: Abnormal   Result Value Ref Range    Cholesterol 258 (H) <200 mg/dL    Triglycerides 137 <150 mg/dL    Direct Measure HDL 45 >=40 mg/dL    LDL Cholesterol Calculated 186 (H) <=100 mg/dL    Non HDL Cholesterol 213 (H) <130 mg/dL    Narrative    Cholesterol  Desirable:  <200 mg/dL    Triglycerides  Normal:  Less than 150 mg/dL  Borderline High:  150-199 mg/dL  High:  200-499 mg/dL  Very High:  Greater than or equal to 500 mg/dL    Direct Measure HDL  Female:  Greater than or equal to 50 mg/dL   Male:  Greater than or equal to 40 mg/dL    LDL Cholesterol  Desirable:  " <100mg/dL  Above Desirable:  100-129 mg/dL   Borderline High:  130-159 mg/dL   High:  160-189 mg/dL   Very High:  >= 190 mg/dL    Non HDL Cholesterol  Desirable:  130 mg/dL  Above Desirable:  130-159 mg/dL  Borderline High:  160-189 mg/dL  High:  190-219 mg/dL  Very High:  Greater than or equal to 220 mg/dL   ALT     Status: Normal   Result Value Ref Range    ALT 30 10 - 50 U/L   Glucose whole blood     Status: Normal   Result Value Ref Range    Glucose Whole Blood 91 60 - 99 mg/dL                     Jeremy Townsend MD  [facility]  Answers for HPI/ROS submitted by the patient on 2/22/2023  If you checked off any problems, how difficult have these problems made it for you to do your work, take care of things at home, or get along with other people?: Not difficult at all  PHQ9 TOTAL SCORE: 3    BP Readings from Last 6 Encounters:   02/22/23 131/89   12/16/21 136/86   09/17/20 127/82

## 2023-02-22 NOTE — ASSESSMENT & PLAN NOTE
Will be due for follow-up colonoscopy this September.  He believes he will be contacted, asked him to contact us if he has not.

## 2023-05-01 ENCOUNTER — MYC REFILL (OUTPATIENT)
Dept: FAMILY MEDICINE | Facility: CLINIC | Age: 43
End: 2023-05-01
Payer: COMMERCIAL

## 2023-05-01 DIAGNOSIS — F32.1 MAJOR DEPRESSIVE DISORDER, SINGLE EPISODE, MODERATE (H): ICD-10-CM

## 2023-05-01 NOTE — TELEPHONE ENCOUNTER
"Routing refill request to provider for review/approval because:  Labs not current:  No CR on file in past 12 months    Last Written Prescription Date:  1/25/23  Last Fill Quantity: 270,  # refills: 0   Last office visit provider:  2/22/23     Requested Prescriptions   Pending Prescriptions Disp Refills     venlafaxine (EFFEXOR XR) 75 MG 24 hr capsule 270 capsule 0     Sig: Take 3 capsules (225 mg) by mouth daily       Serotonin-Norepinephrine Reuptake Inhibitors  Failed - 5/1/2023  8:56 AM        Failed - Normal serum creatinine on file in past 12 months     No lab results found.    Ok to refill medication if creatinine is low          Passed - Blood pressure under 140/90 in past 12 months     BP Readings from Last 3 Encounters:   02/22/23 131/89   12/16/21 136/86   09/17/20 127/82                 Passed - PHQ-9 score of less than 5 in past 6 months     Please review last PHQ-9 score.           Passed - Medication is active on med list        Passed - Patient is age 18 or older        Passed - Recent (6 mo) or future (30 days) visit within the authorizing provider's specialty     Patient had office visit in the last 6 months or has a visit in the next 30 days with authorizing provider or within the authorizing provider's specialty.  See \"Patient Info\" tab in inbasket, or \"Choose Columns\" in Meds & Orders section of the refill encounter.                 ALONZO PATTERSON RN 05/01/23 1:46 PM  "

## 2023-05-02 RX ORDER — VENLAFAXINE HYDROCHLORIDE 75 MG/1
225 CAPSULE, EXTENDED RELEASE ORAL DAILY
Qty: 270 CAPSULE | Refills: 0 | Status: SHIPPED | OUTPATIENT
Start: 2023-05-02 | End: 2023-10-30

## 2023-09-28 ENCOUNTER — TRANSFERRED RECORDS (OUTPATIENT)
Dept: HEALTH INFORMATION MANAGEMENT | Facility: CLINIC | Age: 43
End: 2023-09-28
Payer: COMMERCIAL

## 2023-12-11 ENCOUNTER — PATIENT OUTREACH (OUTPATIENT)
Dept: GASTROENTEROLOGY | Facility: CLINIC | Age: 43
End: 2023-12-11
Payer: COMMERCIAL

## 2024-01-23 ENCOUNTER — PATIENT OUTREACH (OUTPATIENT)
Dept: CARE COORDINATION | Facility: CLINIC | Age: 44
End: 2024-01-23
Payer: COMMERCIAL

## 2024-01-24 DIAGNOSIS — F32.1 MAJOR DEPRESSIVE DISORDER, SINGLE EPISODE, MODERATE (H): ICD-10-CM

## 2024-01-24 RX ORDER — VENLAFAXINE HYDROCHLORIDE 75 MG/1
225 CAPSULE, EXTENDED RELEASE ORAL DAILY
Qty: 270 CAPSULE | Refills: 0 | Status: SHIPPED | OUTPATIENT
Start: 2024-01-24 | End: 2024-04-25

## 2024-02-06 ENCOUNTER — PATIENT OUTREACH (OUTPATIENT)
Dept: CARE COORDINATION | Facility: CLINIC | Age: 44
End: 2024-02-06
Payer: COMMERCIAL

## 2024-04-14 SDOH — HEALTH STABILITY: PHYSICAL HEALTH: ON AVERAGE, HOW MANY DAYS PER WEEK DO YOU ENGAGE IN MODERATE TO STRENUOUS EXERCISE (LIKE A BRISK WALK)?: 7 DAYS

## 2024-04-14 SDOH — HEALTH STABILITY: PHYSICAL HEALTH: ON AVERAGE, HOW MANY MINUTES DO YOU ENGAGE IN EXERCISE AT THIS LEVEL?: 20 MIN

## 2024-04-14 ASSESSMENT — SOCIAL DETERMINANTS OF HEALTH (SDOH): HOW OFTEN DO YOU GET TOGETHER WITH FRIENDS OR RELATIVES?: MORE THAN THREE TIMES A WEEK

## 2024-04-14 NOTE — COMMUNITY RESOURCES LIST (ENGLISH)
April 14, 2024           YOUR PERSONALIZED LIST OF SERVICES & PROGRAMS           & SHELTER    Housing      [facility] Access - Emergency housing  [address] [place] [address] (Distance: 2.7 miles)  Language: English  Fee: Free      [place] - [place] - Coordinated Access - Coordinated Entry access point  [address] [place] [address] (Distance: 2.7 miles)  Phone: [phone]  Language: English  Fee: Free      [facility] - Housing Stabilization Services  Phone: [phone]) 154-5432  Website: https://91datong.com/Housing-Stabilization.html  Language: English  Hours: Mon 9:00 AM - 5:00 PM Tue 9:00 AM - 5:00 PM Wed 9:00 AM - 5:00 PM Thu 9:00 AM - 5:00 PM Fri 9:00 AM - 5:00 PM  Fee: Insurance  Accessibility: Deaf or hard of hearing, Translation services    Case Management      [facility] - Housing search assistance  [address] [place] [address] (Distance: 2.6 miles)  Phone: [phone]  Language: English, Swedish, Jude, Hmong  Fee: Free  Accessibility: Ada accessible, Blind accommodation, Deaf or hard of hearing, Translation services      [facility] [place] Access  [address] [place] [address] (Distance: 2.7 miles)  Phone: [phone]  Language: English  Fee: Free  Accessibility: Translation services, Ada accessible      [employer] - Housing Stabilization Services  Phone: [phone]  Website: https://homebasemn.com/  Language: English  Hours: Mon 8:00 AM - 4:00 PM Tue 8:00 AM - 4:00 PM Wed 8:00 AM - 4:00 PM Thu 8:00 AM - 4:00 PM Fri 8:00 AM - 4:00 PM  Fee: Free  Accessibility: Blind accommodation, Deaf or hard of hearing  Transportation Options: Free transportation    Drop-In Services      [facility] Warming or cooling [place] - [facility] [address] [place] [address] (Distance: 1.7 miles)  Phone: [phone]  Language: English  Fee: Free   Accessibility: Ada accessible  Transportation Options: Free transportation      [employer]  [address] [place] [address] (Distance: 1.1 miles)  Phone: [phone]  Language: English  Fee: Free      [employer] [employer]  Website: http://www.laundrylove.org               IMPORTANT NUMBERS & WEBSITES        Emergency Services  911  .   United Way  211 http://211unitedway.org  .   Poison Control  (836) 834-2736 http://[place]poison.org http://wis[place]poison.org  .     Suicide and Crisis Lifeline  988 http://988Elementumline.[employer]  .   Childhelp [place] Child Abuse Hotline  520.660.5754 http://Childhelphotline.org   .   National Sexual Assault Hotline  (214) 681-9540 (HOPE) http://School Places.org   .     [place] Runaway Safeline  (726) 754-5556 (RUNAWAY) http://EcoSurge.[employer]  .   Pregnancy & Postpartum Support  Call/text 032-204-3512  [place]: http://ppsupport[place].org  [place]: http://psichapters.com/wi  .   Substance Abuse National Helpline ([facility])  087-665-HELP (4814) http://Findtreatment.gov   .                DISCLAIMER: These resources have been generated via the [employer] Platform. [employer] does not endorse any service providers mentioned in this resource list. [employer] does not guarantee that the services mentioned in this resource list will be available to you or will improve your health or wellness.    [facility]

## 2024-04-17 ASSESSMENT — PATIENT HEALTH QUESTIONNAIRE - PHQ9
SUM OF ALL RESPONSES TO PHQ QUESTIONS 1-9: 4
SUM OF ALL RESPONSES TO PHQ QUESTIONS 1-9: 4
10. IF YOU CHECKED OFF ANY PROBLEMS, HOW DIFFICULT HAVE THESE PROBLEMS MADE IT FOR YOU TO DO YOUR WORK, TAKE CARE OF THINGS AT HOME, OR GET ALONG WITH OTHER PEOPLE: NOT DIFFICULT AT ALL

## 2024-04-18 ENCOUNTER — OFFICE VISIT (OUTPATIENT)
Dept: FAMILY MEDICINE | Facility: CLINIC | Age: 44
End: 2024-04-18
Payer: COMMERCIAL

## 2024-04-18 VITALS
TEMPERATURE: 98.3 F | SYSTOLIC BLOOD PRESSURE: 124 MMHG | RESPIRATION RATE: 18 BRPM | HEART RATE: 96 BPM | WEIGHT: 289.75 LBS | DIASTOLIC BLOOD PRESSURE: 86 MMHG | BODY MASS INDEX: 39.25 KG/M2 | HEIGHT: 72 IN | OXYGEN SATURATION: 99 %

## 2024-04-18 DIAGNOSIS — E55.9 VITAMIN D DEFICIENCY: Primary | ICD-10-CM

## 2024-04-18 DIAGNOSIS — Z11.59 NEED FOR HEPATITIS C SCREENING TEST: ICD-10-CM

## 2024-04-18 DIAGNOSIS — M54.16 LUMBAR RADICULOPATHY: ICD-10-CM

## 2024-04-18 DIAGNOSIS — E66.01 MORBID OBESITY (H): ICD-10-CM

## 2024-04-18 DIAGNOSIS — M54.42 ACUTE LEFT-SIDED LOW BACK PAIN WITH LEFT-SIDED SCIATICA: ICD-10-CM

## 2024-04-18 DIAGNOSIS — Z11.4 SCREENING FOR HIV (HUMAN IMMUNODEFICIENCY VIRUS): ICD-10-CM

## 2024-04-18 DIAGNOSIS — Z00.00 HEALTHCARE MAINTENANCE: ICD-10-CM

## 2024-04-18 DIAGNOSIS — E78.00 PURE HYPERCHOLESTEROLEMIA: ICD-10-CM

## 2024-04-18 PROCEDURE — 84460 ALANINE AMINO (ALT) (SGPT): CPT | Performed by: FAMILY MEDICINE

## 2024-04-18 PROCEDURE — 82306 VITAMIN D 25 HYDROXY: CPT | Performed by: FAMILY MEDICINE

## 2024-04-18 PROCEDURE — 36415 COLL VENOUS BLD VENIPUNCTURE: CPT | Performed by: FAMILY MEDICINE

## 2024-04-18 PROCEDURE — 80061 LIPID PANEL: CPT | Performed by: FAMILY MEDICINE

## 2024-04-18 PROCEDURE — 99213 OFFICE O/P EST LOW 20 MIN: CPT | Mod: 25 | Performed by: FAMILY MEDICINE

## 2024-04-18 PROCEDURE — 99396 PREV VISIT EST AGE 40-64: CPT | Performed by: FAMILY MEDICINE

## 2024-04-18 NOTE — LETTER
My Depression Action Plan  Name: Gavin Jose   Date of Birth 1980  Date: 4/18/2024    My doctor: Jeremy Townsend   My clinic: [facility] [address] [place] [id number] [phone]            GREEN    ZONE   Good Control    What it looks like:   Things are going generally well. You have normal ups and downs. You may even feel depressed from time to time, but bad moods usually last less than a day.   What you need to do:  Continue to care for yourself (see self care plan)  Check your depression survival kit and update it as needed  Follow your physician s recommendations including any medication.  Do not stop taking medication unless you consult with your physician first.             YELLOW         ZONE Getting Worse    What it looks like:   Depression is starting to interfere with your life.   It may be hard to get out of bed; you may be starting to isolate yourself from others.  Symptoms of depression are starting to last most all day and this has happened for several days.   You may have suicidal thoughts but they are not constant.   What you need to do:     Call your care team. Your response to treatment will improve if you keep your care team informed of your progress. Yellow periods are signs an adjustment may need to be made.     Continue your self-care.  Just get dressed and ready for the day.  Don't give yourself time to talk yourself out of it.    Talk to someone in your support network.    Open up your Depression Self-Care Plan/Wellness Kit.             RED    ZONE Medical Alert - Get Help    What it looks like:   Depression is seriously interfering with your life.   You may experience these or other symptoms: You can t get out of bed most days, can t work or engage in other necessary activities, you have trouble taking care of basic hygiene, or basic responsibilities, thoughts of suicide or death that will not go away, self-injurious behavior.      What you need to do:  Call your care team and request a same-day appointment. If they are not available (weekends or after hours) call your local crisis line, emergency room or 911.          Depression Self-Care Plan / Wellness Kit    Many people find that medication and therapy are helpful treatments for managing depression. In addition, making small changes to your everyday life can help to boost your mood and improve your wellbeing. Below are some tips for you to consider. Be sure to talk with your medical provider and/or behavioral health consultant if your symptoms are worsening or not improving.     Sleep   Sleep hygiene  means all of the habits that support good, restful sleep. It includes maintaining a consistent bedtime and wake time, using your bedroom only for sleeping or sex, and keeping the bedroom dark and free of distractions like a computer, smartphone, or television.     Develop a Healthy Routine  Maintain good hygiene. Get out of bed in the morning, make your bed, brush your teeth, take a shower, and get dressed. Don t spend too much time viewing media that makes you feel stressed. Find time to relax each day.    Exercise  Get some form of exercise every day. This will help reduce pain and release endorphins, the  feel good  chemicals in your brain. It can be as simple as just going for a walk or doing some gardening, anything that will get you moving.      Diet  Strive to eat healthy foods, including fruits and vegetables. Drink plenty of water. Avoid excessive sugar, caffeine, alcohol, and other mood-altering substances.     Stay Connected with Others  Stay in touch with friends and family members.    Manage Your Mood  Try deep breathing, massage therapy, biofeedback, or meditation. Take part in fun activities when you can. Try to find something to smile about each day.     Psychotherapy  Be open to working with a therapist if your provider recommends it.     Medication  Be sure to take your  medication as prescribed. Most anti-depressants need to be taken every day. It usually takes several weeks for medications to work. Not all medicines work for all people. It is important to follow-up with your provider to make sure you have a treatment plan that is working for you. Do not stop your medication abruptly without first discussing it with your provider.    Crisis Resources   These hotlines are for both adults and children. They and are open 24 hours a day, 7 days a week unless noted otherwise.    National Suicide Prevention Lifeline   988 or 1-392-763-DOCI (7182)    Crisis Text Line    www.crisistextline.org  Text HOME to 940570 from anywhere in the [place], anytime, about any type of crisis. A live, trained crisis counselor will receive the text and respond quickly.    [employer] Lifeline for LGBTQ Youth  A national crisis intervention and suicide lifeline for LGBTQ youth under 25. Provides a safe place to talk without judgement. Call 1-718.247.7084; text START to 206967 or visit www.theCounsylvorproject.org to talk to a trained counselor.    For [facility] crisis numbers, visit the [facility] website at:  https://[place].gov/dhs/people-we-serve/adults/health-care/mental-health/resources/crisis-contacts.jsp

## 2024-04-18 NOTE — PROGRESS NOTES
Adult Male Physical  A/P  Pure hypercholesterolemia   last year, working on diet.  Check lipid and ALT.    Healthcare maintenance  [facility] COVID booster because he gets sick from them.  Discussed option of hepatitis B and he feels he is low risk and does not need this.    Lumbar radiculopathy  Patient with left lateral thigh pain that extends down into his foot with some numbness.  Previous history of pain in the exact same distribution deemed due to herniated disc.  Has had physical therapy which did not help but epidural steroids which did.  Most recent MRI  IMPRESSION:  CONCLUSION:  1.  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  2.  No additional neural impingement is identified.  3.  Slight annular bulge with tiny focal annular tear L2-L3.  4.  Mild facet hypertrophy L5-S1.  Requesting consideration of epidural steroid injection again.  Agree, refer, will need new MRI.  This was ordered.  Await results.        Morbid obesity (H)  Working on diet and exercise.    Vitamin D deficiency  Check vitamin D.  Supplementing with only multivitamin.    Acute left-sided low back pain with left-sided sciatica  See discussion above         HPI  Current Concerns/ Questions  Chief concern today is left leg nerve pain.  Began about 2 to 3 months ago where he started having discomfort again.  Left lateral leg, moderate intensity, comes and goes.  Then occasional numbness in the leg.  No weakness.  See discussion above.  Began having being problems with herniated disc in 2010.  PFSH:  Current medications reviewed as follows:  Current Outpatient Medications   Medication Sig Dispense Refill    venlafaxine (EFFEXOR XR) 75 MG 24 hr capsule TAKE THREE CAPSULES BY MOUTH DAILY 270 capsule 0     No current facility-administered medications for this visit.      Patient Active Problem  List   Diagnosis    Major depressive disorder, single episode, moderate (H)    Pure hypercholesterolemia    Lumbar radiculopathy    Healthcare maintenance    Family history of colon cancer    Colon adenomas    Morbid obesity (H)    Vitamin D deficiency    Acute left-sided low back pain with left-sided sciatica     Past Medical History:   Diagnosis Date    Depressive disorder      Past Surgical History:   Procedure Laterality Date    BACK SURGERY  ,     COLONOSCOPY  2020     Family History   Problem Relation Age of Onset    No Known Problems Mother     No Known Problems Father     No Known Problems Sister     No Known Problems Brother     Heart Disease Maternal Grandmother     Diabetes Maternal Grandmother          in     Heart Disease Paternal Grandmother     Cancer Paternal Grandfather     Colon Cancer Paternal Grandfather         Colostomy in mid-;  in     No Known Problems Brother      History   Smoking Status    Never   Smokeless Tobacco    Never       Social History     Social History Narrative    , wife is a nurse.  2 children  Going to graduate school at [employer]     Immunization History   Administered Date(s) Administered    COVID-19 MONOVALENT 12+ (Pfizer) 2021, 2021, 2021    Flu, Unspecified 2013, 2014, 10/09/2015, 10/03/2022    Hepatitis A (ADULT 19+) 2016    Hepatitis B, Adult 2016    Influenza Vaccine (Flucelvax Quadrivalent) 10/03/2022, 10/25/2023    Influenza Vaccine >6 months,quad, PF 2019, 2020, 10/04/2021    Influenza Vaccine, 6+MO IM (QUADRIVALENT W/PRESERVATIVES) 10/20/2016, 10/08/2019    Nasal Influenza Vaccine 2-49 (FluMist) 2013, 2014, 10/09/2015    TDAP (Adacel,Boostrix) 2009, 2019     Body mass index is 38.9 kg/m .        Objective  Physical Exam  Vitals:    24 1531 24 1536   BP: (!) 132/91 124/86   Pulse: 90 96   Resp: 18   "  Temp: 98.3  F (36.8  C)    SpO2: 99%    Weight: 131.4 kg (289 lb 12 oz)    Height: 1.838 m (6' 0.36\")        Gen- alert and oriented x3, no acute distress  HEENT- Normocephalic atraumatic   pupils equal and reactive, EOMI.    TMs visualized and normal, ear canals normal.    Mouth moist with normal mucosa no ulceration, dentition normal or in good repair.  Neck- supple, no adenopathy or thyromegaly  Chest- Normal chest wall apperance, normal inspiration and expiration.  Clear to asculation.  CV- Regular rate and rhythm, normal tones, no murmus, gallops or rubs.  Abd-  Soft, nodistended, nontender.  Normal bowel sounds, no mass or organ enlargement.  Genitalia-  was not done  NATALYA: was not done  Ext- Atraumatic,  No synovial thickening. Good perfusion, no edema. Periph pulses detected  Skin- warm and dry, no rash  Neuro- Cranial nerves grossly intact.  Normal gait, normal strength.  Reflexes symmetric.    Negative straight leg raise, able to walk on toes heels and crouch without difficulty.  Reflexes intact.    Diagnostics  Pending                "

## 2024-04-18 NOTE — ASSESSMENT & PLAN NOTE
[facility] COVID booster because he gets sick from them.  Discussed option of hepatitis B and he feels he is low risk and does not need this.

## 2024-04-18 NOTE — ASSESSMENT & PLAN NOTE
Patient with left lateral thigh pain that extends down into his foot with some numbness.  Previous history of pain in the exact same distribution deemed due to herniated disc.  Has had physical therapy which did not help but epidural steroids which did.  Most recent MRI  IMPRESSION:  CONCLUSION:  1.  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  2.  No additional neural impingement is identified.  3.  Slight annular bulge with tiny focal annular tear L2-L3.  4.  Mild facet hypertrophy L5-S1.  Requesting consideration of epidural steroid injection again.  Agree, refer, will need new MRI.  This was ordered.  Await results.

## 2024-04-20 LAB
ALT SERPL W P-5'-P-CCNC: 35 U/L (ref 0–70)
CHOLEST SERPL-MCNC: 259 MG/DL
FASTING STATUS PATIENT QL REPORTED: NO
HDLC SERPL-MCNC: 38 MG/DL
LDLC SERPL CALC-MCNC: 171 MG/DL
NONHDLC SERPL-MCNC: 221 MG/DL
TRIGL SERPL-MCNC: 248 MG/DL
VIT D+METAB SERPL-MCNC: 14 NG/ML (ref 20–50)

## 2024-04-25 DIAGNOSIS — F32.1 MAJOR DEPRESSIVE DISORDER, SINGLE EPISODE, MODERATE (H): ICD-10-CM

## 2024-04-25 RX ORDER — VENLAFAXINE HYDROCHLORIDE 75 MG/1
225 CAPSULE, EXTENDED RELEASE ORAL DAILY
Qty: 270 CAPSULE | Refills: 3 | Status: SHIPPED | OUTPATIENT
Start: 2024-04-25

## 2024-05-08 ENCOUNTER — HOSPITAL ENCOUNTER (OUTPATIENT)
Dept: MRI IMAGING | Facility: HOSPITAL | Age: 44
Discharge: HOME | End: 2024-05-08
Attending: FAMILY MEDICINE | Admitting: FAMILY MEDICINE
Payer: COMMERCIAL

## 2024-05-08 DIAGNOSIS — M54.42 ACUTE LEFT-SIDED LOW BACK PAIN WITH LEFT-SIDED SCIATICA: ICD-10-CM

## 2024-05-08 DIAGNOSIS — M54.16 LUMBAR RADICULOPATHY: Primary | ICD-10-CM

## 2024-05-08 PROCEDURE — 72148 MRI LUMBAR SPINE W/O DYE: CPT

## 2024-05-17 ENCOUNTER — OFFICE VISIT (OUTPATIENT)
Dept: PHYSICAL MEDICINE AND REHAB | Facility: CLINIC | Age: 44
End: 2024-05-17
Attending: FAMILY MEDICINE
Payer: COMMERCIAL

## 2024-05-17 VITALS
DIASTOLIC BLOOD PRESSURE: 80 MMHG | WEIGHT: 292.7 LBS | OXYGEN SATURATION: 93 % | HEART RATE: 86 BPM | BODY MASS INDEX: 39.65 KG/M2 | SYSTOLIC BLOOD PRESSURE: 152 MMHG | HEIGHT: 72 IN

## 2024-05-17 DIAGNOSIS — M54.16 LUMBAR RADICULOPATHY: ICD-10-CM

## 2024-05-17 DIAGNOSIS — M96.1 LUMBAR POST-LAMINECTOMY SYNDROME: Primary | ICD-10-CM

## 2024-05-17 DIAGNOSIS — M51.369 LUMBAR DEGENERATIVE DISC DISEASE: ICD-10-CM

## 2024-05-17 PROCEDURE — 99204 OFFICE O/P NEW MOD 45 MIN: CPT | Performed by: STUDENT IN AN ORGANIZED HEALTH CARE EDUCATION/TRAINING PROGRAM

## 2024-05-17 ASSESSMENT — PAIN SCALES - GENERAL: PAINLEVEL: MILD PAIN (3)

## 2024-05-17 NOTE — LETTER
5/17/2024         RE: Gavin Jose  [address] [place] [address]        Dear Colleague,    Thank you for referring your patient, Gavin Jose, to the [facility]. Please see a copy of my visit note below.    ASSESSMENT:  Gavin Jose is a 43 year old male presents for consultation at the request of Jeremy Townsend who presents today for new patient evaluation of :         Diagnoses and all orders for this visit:  Lumbar post-laminectomy syndrome  -     PAIN Transforaminal MARTHA Inj Lumbosacral One Level Left; Future  Lumbar radiculopathy  -     Spine  Referral  -     PAIN Transforaminal MARTHA Inj Lumbosacral One Level Left; Future  Lumbar degenerative disc disease       Patient is neurologically intact on exam. No myelopathic or red flag symptoms.    Patient is a 43-year-old male with history of chronic low back pain and left-sided lumbar radiculopathy status post 2 left-sided lumbar surgeries and previous lumbar epidural injection which was very beneficial, who presents today for evaluation of acute on chronic left-sided low back pain and left-sided lumbosacral radiculopathy suggesting an L5 distribution.  He is intact on exam and has no red flag symptoms, and given his previous positive response with injection I believe it would be reasonable to proceed with a repeat left-sided injection.  Patient and I discussed that injection outcomes can vary on repeat, and if this injection is not helpful there are other options we can consider including surgical consultation or spinal cord stimulation.  Patient is in agreement with this plan, all questions were addressed.    PLAN:  Reviewed spine anatomy and disease process. Discussed diagnosis and treatment options with the patient today. A shared decision making model was used. The patient's values and choices were respected. The following represents what was discussed and decided upon by the provider and the  patient.    1. DIAGNOSTIC TESTS  No new imaging orders at this time.    2. PHYSICAL THERAPY  Patient is already performing a home program, and was encouraged to continue doing so.  Discussed the importance of core strengthening, ROM, stretching exercises with the patient and how each of these entities is important in decreasing pain.  Explained to the patient that the purpose of physical therapy is to teach the patient a home exercise program.  These exercises need to be performed every day in order to decrease pain and prevent future occurrences of pain.  Likened it to brushing one's teeth.      3. MEDICATIONS:  Discussed multiple medication options today with patient. Discussed risks, side effects, and proper use of medications. Patient verbalized understanding.  No new medications ordered at this visit.    4. INTERVENTIONS:  Left L5-S1 Transforaminal Epidural Steroid Injection  Benefits of epidural steroid injection were reviewed including potential improvements in pain, function, and sleep. Potential risks also reviewed, including but not limited to bleeding, bruising, infection, increased or non-improvement of pain, injury to local structures, spinal infection or bleeding, nerve or spinal cord injury which could be temporary or permanent. Risks of steroid use can include increased blood sugar or blood pressure, hormonal disruption, increased fracture risk. After reviewing the risks, benefits, and alternatives, the patient was given an opportunity to ask questions. All questions were addressed, and the patient wishes to move forward with the selected injection.    5. OTHER REFERRALS:  No other referrals at this time.    6. FOLLOW-UP  In approximately 2-4 weeks to assess response to injection.    Advised patient to call the Spine Center if symptoms worsen or you have problems controlling bladder and bowel function.   ______________________________________________________________________    SUBJECTIVE:   Gavin POPE  Damon  is a 43 year old male who presents today for new patient evaluation.    Patient reports that he has had left-sided low back pain going down the side of his leg for many years.  He reports in either 2013 or 14 he underwent a left-sided lumbar surgery at [facility] to address this, with a subsequent surgery of the same area roughly a year subsequent to that.  The pain was controlled for a time, but when the pain recurred he had a series of epidural steroid injections targeting the same area.  The first injection provided over 50% relief for 6 months, the second for 6 weeks, and the third for 6 or 7 years per his report.  All the injections were done at [facility] in [facility] which is now part of [facility].    At present the pain is primarily in the left-sided low back and buttock as a dull aching pain and then travels down the side of the left leg to the foot with associated tingling and paresthesias.  Pain worsens with bending or sitting and improves with rest.  He is using Tylenol and ibuprofen as needed for the pain.    2 prior back surgeries as noted above    -Treatment to Date: As above      Oswestry (RAISA) Questionnaire        5/10/2024    10:27 AM   OSWESTRY DISABILITY INDEX   Count 10   Sum 7   Oswestry Score (%) 14 %       Neck Disability Index:      5/10/2024    10:28 AM   Neck Disability Index (  Jay H. and Deniz C. 1991. All rights reserved.; used with permission)   SECTION 1 - PAIN INTENSITY 0   SECTION 2 - PERSONAL CARE 0   SECTION 3 - LIFTING 0   SECTION 4 - READING 0   SECTION 5 - HEADACHES 1   SECTION 6 - CONCENTRATION 0   SECTION 7 - WORK 0   SECTION 8 - DRIVING 0   SECTION 9 - SLEEPING 1   SECTION 10 - RECREATION 0   Count 10   Sum 2   Raw Score: /50 2   Neck Disability Index Score: (%) 4 %              -Medications:    Current Outpatient Medications   Medication Sig Dispense Refill     venlafaxine (EFFEXOR XR) 75 MG 24 hr capsule TAKE THREE CAPSULES BY MOUTH DAILY 270 capsule 3     No current  "facility-administered medications for this visit.       No Known Allergies    Past Medical History:   Diagnosis Date     Depressive disorder         Patient Active Problem List   Diagnosis     Major depressive disorder, single episode, moderate (H)     Pure hypercholesterolemia     Lumbar radiculopathy     Healthcare maintenance     Family history of colon cancer     Colon adenomas     Morbid obesity (H)     Vitamin D deficiency     Acute left-sided low back pain with left-sided sciatica       Past Surgical History:   Procedure Laterality Date     BACK SURGERY  ,      COLONOSCOPY  2020       Family History   Problem Relation Age of Onset     No Known Problems Mother      No Known Problems Father      No Known Problems Sister      No Known Problems Brother      Heart Disease Maternal Grandmother      Diabetes Maternal Grandmother          in      Heart Disease Paternal Grandmother      Cancer Paternal Grandfather      Colon Cancer Paternal Grandfather         Colostomy in mid-;  in      No Known Problems Brother        Reviewed past medical, surgical, and family history with patient found on new patient intake packet located in EMR Media tab.     SOCIAL HX: Reviewed    ROS: Specifically negative for bowel/bladder dysfunction, balance changes, headache, dizziness, foot drop, fevers, chills, appetite changes, nausea/vomiting, unexplained weight loss. Otherwise 13 systems reviewed are negative. Please see the patient's intake questionnaire from today for details.    OBJECTIVE:  BP (!) 152/80 (BP Location: Left arm, Patient Position: Sitting, Cuff Size: Adult Large)   Pulse 86   Ht 6' 0.25\" (1.835 m)   Wt 292 lb 11.2 oz (132.8 kg)   SpO2 93%   BMI 39.42 kg/m      PHYSICAL EXAMINATION:  --CONSTITUTIONAL: Vital signs as above. No acute distress. The patient is well nourished and well groomed.  --PSYCHIATRIC: The patient is awake, alert, oriented to person, place, time and " answering questions appropriately with clear speech. Appropriate mood and affect   --RESPIRATORY: Normal rhythm and effort. No abnormal accessory muscle breathing patterns noted.   --GROSS MOTOR: Easily arises from a seated position.  --LUMBAR SPINE: Inspection reveals no evidence of deformity. Range of motion is not limited in flexion, extension, lateral rotation. Mild tenderness to palpation of lumbar paraspinals, no tenderness in spinous processes.  Facet loading (Glover test) negative, seated SLR positive on the left side  --HIPS: Full range of motion bilaterally.  --LOWER EXTREMITY MOTOR TESTING:  Hip flexion left 5/5, right 5/5  Knee extension left 5/5, right 5/5  Ankle dorsiflexors left 5/5, right 5/5  Ankle plantarflexors left 5/5, right 5/5   Great toe extension left 5/5, right 5/5   Knee flexion left 5/5, right 5/5  --NEUROLOGIC: Sensation to lower extremities is intact bilaterally in L2-S1 dermatomes.  Negative Prater's bilaterally. Reflexes intact in BLE, no clonus.  --VASCULAR: Warm upper limbs bilaterally. Capillary refill in the upper extremities is less than 1 second.    RESULTS: Available medical records from [facility] and any other outside records were reviewed today.     Imaging:  Available relevant imaging was personally reviewed and interpreted today. The images were shown to the patient and the findings were explained using a spine model.    MR Lumbar Spine w/o Contrast    Result Date: 5/8/2024  [facility] MR LUMBAR SPINE W/O CONTRAST 5/8/2024 7:27 AM CDT INDICATION: Low back pain. History of spine surgery; rule out hardware failure. New acute radiculopathy. No known automatically detected potential contraindications to imaging. TECHNIQUE: Routine. CONTRAST: None. COMPARISON: 8/28/2017. FINDINGS: Nomenclature is based on 5 lumbar type vertebral bodies. Small chronic Schmorl's nodes within several endplates between T11 and L4 are generally similar compared to  prior. 1 mm retrolisthesis of L2 on L3 and L3 on L4 progressed in the interval.  Mild Modic type I edematous degenerative endplate changes at L5-S1. L1 vertebral body hemangioma. No pars defect. The conus tip is identified at L1-L2. Unremarkable paraspinal soft tissues. T12-L1: Slight loss of disc signal. Normal disc height. No herniation. Unremarkable facets. No stenosis. L1-L2: Slight loss of disc height and signal. No herniation. Unremarkable facets. No stenosis. L2-L3: Mild loss of disc height and signal. Central annular tear. Mild circumferential annular bulging. Normal facets. Mild lateral recess, spinal canal and minor foraminal narrowing. Minor interval progression. L3-L4: Mild loss of disc height and signal. Minimal annular bulging. Normal facets. No central stenosis. Minor foraminal narrowing. Slight interval progression. L4-L5: Normal disc height and signal. Minor annular bulging. Mild facet arthropathy. No central stenosis. Mild left and minor right foraminal narrowing. L5-S1: Mild to moderate loss of disc height and signal. Probably postoperative changes of laminotomy on the left. Circumferential disc bulge with left paracentral caudal extrusion. Mild to moderate facet arthropathy. Mild to moderate left lateral recess narrowing. Adequate right lateral recess and central canal. Moderate to severe left and mild to moderate right foraminal narrowing. Findings are similar prior.     IMPRESSION: 1.  Degenerative changes most pronounced at L5-S1 are relatively similar to prior where there is a left-sided disc herniation superimposed upon a disc bulge and facet arthropathy that results in mild to moderate left lateral recess narrowing and moderate  to severe left foraminal narrowing. Correlate for any left S1 or L5 symptoms respectively. Mild to moderate right foraminal narrowing at this level. 2.  Less pronounced degenerative changes L2-L3 through L4-L5 with slight interval progression compared to 2017. Minor  foraminal narrowing at L2-L3, L3-L4 and L4-L5.          Again, thank you for allowing me to participate in the care of your patient.        Sincerely,        Bubba Brandon MD

## 2024-05-17 NOTE — PATIENT INSTRUCTIONS
~Spine Center Scheduling #[phone].  ~Please call our [facility] Spine Nurse Navigation #[phone] with any questions or concerns about your treatment plan, if symptoms worsen and you would like to be seen urgently, or if you have problems controlling bladder and bowel function.  ~For any future flareups or new symptoms, recommend follow-up in clinic or contact the nurse navigator line.  ~Please note that any My Chart messages may take multiple days for a response due to the high volume of patients seen in clinic.  Anything sent Friday night or after will be answered the following week when able.     An injection has been ordered today to potentially help with your pain symptoms. These injections do not fix what is going on in your back, therefore they typically do not take away the pain completely, however they can many times help improve symptoms. Injections should always be completed along with other modalities such as physical therapy for the best long term outcomes. If injections alone are done, then pain will likely return.     [facility] Injection Requirements    A  is required for all fluoroscopically-guided injections.  Injection appointments may be cancelled if there are signs/symptoms of an active infection or if the patient is being actively treated with antibiotics for a diagnosed infection.  Patients may have their steroid injection cancelled if they have had another steroid injection within 2 weeks.  Diabetic patients will have their blood glucose levels checked the day of their injection and the appointment will be rescheduled if the blood glucose level is 300 or higher.  Patients with allergies to cortisone, local anesthetics, iodine, or contrast dye should contact the Spine Center to further discuss these considerations.  Patients scheduled for medial branch block diagnostic injections should refrain from taking pain medication the day of the  procedure.  The medial branch block injection appointment will be rescheduled if the patient's pain rating is not 5/10 or greater at the time of the procedure.  Patients taking warfarin/Coumadin will have their INR checked the day of the procedure and the procedure may be rescheduled if the INR is greater than 3.0.  Please contact the Spine Center (#[phone]) if you are taking any prescription blood-thinning medications (warfarin, Plavix, Lovenox, Eliquis, Brilinta, Effient, etc.) as special dosing adjustments may need to be made depending on the type of injection you are scheduled to receive.  It is recommended that you delay having your steroid injection if you have received a flu shot or shingles vaccine within 2 weeks.

## 2024-05-17 NOTE — PROGRESS NOTES
ASSESSMENT:  Gavin Jose is a 43 year old male presents for consultation at the request of Jeremy Townsend who presents today for new patient evaluation of :         Diagnoses and all orders for this visit:  Lumbar post-laminectomy syndrome  -     PAIN Transforaminal MARTHA Inj Lumbosacral One Level Left; Future  Lumbar radiculopathy  -     Spine  Referral  -     PAIN Transforaminal MARTHA Inj Lumbosacral One Level Left; Future  Lumbar degenerative disc disease       Patient is neurologically intact on exam. No myelopathic or red flag symptoms.    Patient is a 43-year-old male with history of chronic low back pain and left-sided lumbar radiculopathy status post 2 left-sided lumbar surgeries and previous lumbar epidural injection which was very beneficial, who presents today for evaluation of acute on chronic left-sided low back pain and left-sided lumbosacral radiculopathy suggesting an L5 distribution.  He is intact on exam and has no red flag symptoms, and given his previous positive response with injection I believe it would be reasonable to proceed with a repeat left-sided injection.  Patient and I discussed that injection outcomes can vary on repeat, and if this injection is not helpful there are other options we can consider including surgical consultation or spinal cord stimulation.  Patient is in agreement with this plan, all questions were addressed.    PLAN:  Reviewed spine anatomy and disease process. Discussed diagnosis and treatment options with the patient today. A shared decision making model was used. The patient's values and choices were respected. The following represents what was discussed and decided upon by the provider and the patient.    1. DIAGNOSTIC TESTS  No new imaging orders at this time.    2. PHYSICAL THERAPY  Patient is already performing a home program, and was encouraged to continue doing so.  Discussed the importance of core strengthening, ROM, stretching exercises with the  patient and how each of these entities is important in decreasing pain.  Explained to the patient that the purpose of physical therapy is to teach the patient a home exercise program.  These exercises need to be performed every day in order to decrease pain and prevent future occurrences of pain.  Likened it to brushing one's teeth.      3. MEDICATIONS:  Discussed multiple medication options today with patient. Discussed risks, side effects, and proper use of medications. Patient verbalized understanding.  No new medications ordered at this visit.    4. INTERVENTIONS:  Left L5-S1 Transforaminal Epidural Steroid Injection  Benefits of epidural steroid injection were reviewed including potential improvements in pain, function, and sleep. Potential risks also reviewed, including but not limited to bleeding, bruising, infection, increased or non-improvement of pain, injury to local structures, spinal infection or bleeding, nerve or spinal cord injury which could be temporary or permanent. Risks of steroid use can include increased blood sugar or blood pressure, hormonal disruption, increased fracture risk. After reviewing the risks, benefits, and alternatives, the patient was given an opportunity to ask questions. All questions were addressed, and the patient wishes to move forward with the selected injection.    5. OTHER REFERRALS:  No other referrals at this time.    6. FOLLOW-UP  In approximately 2-4 weeks to assess response to injection.    Advised patient to call the [facility] if symptoms worsen or you have problems controlling bladder and bowel function.   ______________________________________________________________________    SUBJECTIVE:   Gavin Jose  is a 43 year old male who presents today for new patient evaluation.    Patient reports that he has had left-sided low back pain going down the side of his leg for many years.  He reports in either 2013 or 14 he underwent a left-sided lumbar surgery at  [facility] to address this, with a subsequent surgery of the same area roughly a year subsequent to that.  The pain was controlled for a time, but when the pain recurred he had a series of epidural steroid injections targeting the same area.  The first injection provided over 50% relief for 6 months, the second for 6 weeks, and the third for 6 or 7 years per his report.  All the injections were done at [facility] in [facility] which is now part of [facility].    At present the pain is primarily in the left-sided low back and buttock as a dull aching pain and then travels down the side of the left leg to the foot with associated tingling and paresthesias.  Pain worsens with bending or sitting and improves with rest.  He is using Tylenol and ibuprofen as needed for the pain.    2 prior back surgeries as noted above    -Treatment to Date: As above      Oswestry (RAISA) Questionnaire        5/10/2024    10:27 AM   OSWESTRY DISABILITY INDEX   Count 10   Sum 7   Oswestry Score (%) 14 %       Neck Disability Index:      5/10/2024    10:28 AM   Neck Disability Index (  Jay H. and Deniz C. 1991. All rights reserved.; used with permission)   SECTION 1 - PAIN INTENSITY 0   SECTION 2 - PERSONAL CARE 0   SECTION 3 - LIFTING 0   SECTION 4 - READING 0   SECTION 5 - HEADACHES 1   SECTION 6 - CONCENTRATION 0   SECTION 7 - WORK 0   SECTION 8 - DRIVING 0   SECTION 9 - SLEEPING 1   SECTION 10 - RECREATION 0   Count 10   Sum 2   Raw Score: /50 2   Neck Disability Index Score: (%) 4 %              -Medications:    Current Outpatient Medications   Medication Sig Dispense Refill    venlafaxine (EFFEXOR XR) 75 MG 24 hr capsule TAKE THREE CAPSULES BY MOUTH DAILY 270 capsule 3     No current facility-administered medications for this visit.       No Known Allergies    Past Medical History:   Diagnosis Date    Depressive disorder 2015        Patient Active Problem List   Diagnosis    Major depressive disorder, single episode, moderate (H)    Pure  "hypercholesterolemia    Lumbar radiculopathy    Healthcare maintenance    Family history of colon cancer    Colon adenomas    Morbid obesity (H)    Vitamin D deficiency    Acute left-sided low back pain with left-sided sciatica       Past Surgical History:   Procedure Laterality Date    BACK SURGERY  2014    COLONOSCOPY  2020       Family History   Problem Relation Age of Onset    No Known Problems Mother     No Known Problems Father     No Known Problems Sister     No Known Problems Brother     Heart Disease Maternal Grandmother     Diabetes Maternal Grandmother          in     Heart Disease Paternal Grandmother     Cancer Paternal Grandfather     Colon Cancer Paternal Grandfather         Colostomy in mid-;  in     No Known Problems Brother        Reviewed past medical, surgical, and family history with patient found on new patient intake packet located in EMR Media tab.     SOCIAL HX: Reviewed    ROS: Specifically negative for bowel/bladder dysfunction, balance changes, headache, dizziness, foot drop, fevers, chills, appetite changes, nausea/vomiting, unexplained weight loss. Otherwise 13 systems reviewed are negative. Please see the patient's intake questionnaire from today for details.    OBJECTIVE:  BP (!) 152/80 (BP Location: Left arm, Patient Position: Sitting, Cuff Size: Adult Large)   Pulse 86   Ht 6' 0.25\" (1.835 m)   Wt 292 lb 11.2 oz (132.8 kg)   SpO2 93%   BMI 39.42 kg/m      PHYSICAL EXAMINATION:  --CONSTITUTIONAL: Vital signs as above. No acute distress. The patient is well nourished and well groomed.  --PSYCHIATRIC: The patient is awake, alert, oriented to person, place, time and answering questions appropriately with clear speech. Appropriate mood and affect   --RESPIRATORY: Normal rhythm and effort. No abnormal accessory muscle breathing patterns noted.   --GROSS MOTOR: Easily arises from a seated position.  --LUMBAR SPINE: Inspection reveals no evidence of " deformity. Range of motion is not limited in flexion, extension, lateral rotation. Mild tenderness to palpation of lumbar paraspinals, no tenderness in spinous processes.  Facet loading (Glover test) negative, seated SLR positive on the left side  --HIPS: Full range of motion bilaterally.  --LOWER EXTREMITY MOTOR TESTING:  Hip flexion left 5/5, right 5/5  Knee extension left 5/5, right 5/5  Ankle dorsiflexors left 5/5, right 5/5  Ankle plantarflexors left 5/5, right 5/5   Great toe extension left 5/5, right 5/5   Knee flexion left 5/5, right 5/5  --NEUROLOGIC: Sensation to lower extremities is intact bilaterally in L2-S1 dermatomes.  Negative Prater's bilaterally. Reflexes intact in BLE, no clonus.  --VASCULAR: Warm upper limbs bilaterally. Capillary refill in the upper extremities is less than 1 second.    RESULTS: Available medical records from [facility] and any other outside records were reviewed today.     Imaging:  Available relevant imaging was personally reviewed and interpreted today. The images were shown to the patient and the findings were explained using a spine model.    MR Lumbar Spine w/o Contrast    Result Date: 5/8/2024  [facility] MR LUMBAR SPINE W/O CONTRAST 5/8/2024 7:27 AM CDT INDICATION: Low back pain. History of spine surgery; rule out hardware failure. New acute radiculopathy. No known automatically detected potential contraindications to imaging. TECHNIQUE: Routine. CONTRAST: None. COMPARISON: 8/28/2017. FINDINGS: Nomenclature is based on 5 lumbar type vertebral bodies. Small chronic Schmorl's nodes within several endplates between T11 and L4 are generally similar compared to prior. 1 mm retrolisthesis of L2 on L3 and L3 on L4 progressed in the interval.  Mild Modic type I edematous degenerative endplate changes at L5-S1. L1 vertebral body hemangioma. No pars defect. The conus tip is identified at L1-L2. Unremarkable paraspinal soft tissues. T12-L1: Slight  loss of disc signal. Normal disc height. No herniation. Unremarkable facets. No stenosis. L1-L2: Slight loss of disc height and signal. No herniation. Unremarkable facets. No stenosis. L2-L3: Mild loss of disc height and signal. Central annular tear. Mild circumferential annular bulging. Normal facets. Mild lateral recess, spinal canal and minor foraminal narrowing. Minor interval progression. L3-L4: Mild loss of disc height and signal. Minimal annular bulging. Normal facets. No central stenosis. Minor foraminal narrowing. Slight interval progression. L4-L5: Normal disc height and signal. Minor annular bulging. Mild facet arthropathy. No central stenosis. Mild left and minor right foraminal narrowing. L5-S1: Mild to moderate loss of disc height and signal. Probably postoperative changes of laminotomy on the left. Circumferential disc bulge with left paracentral caudal extrusion. Mild to moderate facet arthropathy. Mild to moderate left lateral recess narrowing. Adequate right lateral recess and central canal. Moderate to severe left and mild to moderate right foraminal narrowing. Findings are similar prior.     IMPRESSION: 1.  Degenerative changes most pronounced at L5-S1 are relatively similar to prior where there is a left-sided disc herniation superimposed upon a disc bulge and facet arthropathy that results in mild to moderate left lateral recess narrowing and moderate  to severe left foraminal narrowing. Correlate for any left S1 or L5 symptoms respectively. Mild to moderate right foraminal narrowing at this level. 2.  Less pronounced degenerative changes L2-L3 through L4-L5 with slight interval progression compared to 2017. Minor foraminal narrowing at L2-L3, L3-L4 and L4-L5.

## 2024-06-10 ENCOUNTER — RADIOLOGY INJECTION OFFICE VISIT (OUTPATIENT)
Dept: PHYSICAL MEDICINE AND REHAB | Facility: CLINIC | Age: 44
End: 2024-06-10
Attending: STUDENT IN AN ORGANIZED HEALTH CARE EDUCATION/TRAINING PROGRAM
Payer: COMMERCIAL

## 2024-06-10 VITALS
HEART RATE: 94 BPM | DIASTOLIC BLOOD PRESSURE: 74 MMHG | OXYGEN SATURATION: 96 % | SYSTOLIC BLOOD PRESSURE: 128 MMHG | RESPIRATION RATE: 16 BRPM | TEMPERATURE: 99.1 F

## 2024-06-10 DIAGNOSIS — M54.16 LUMBAR RADICULOPATHY: ICD-10-CM

## 2024-06-10 DIAGNOSIS — M96.1 LUMBAR POST-LAMINECTOMY SYNDROME: ICD-10-CM

## 2024-06-10 PROCEDURE — 64483 NJX AA&/STRD TFRM EPI L/S 1: CPT | Mod: LT | Performed by: STUDENT IN AN ORGANIZED HEALTH CARE EDUCATION/TRAINING PROGRAM

## 2024-06-10 RX ORDER — BUPIVACAINE HYDROCHLORIDE 2.5 MG/ML
INJECTION, SOLUTION EPIDURAL; INFILTRATION; INTRACAUDAL
Status: COMPLETED | OUTPATIENT
Start: 2024-06-10 | End: 2024-06-10

## 2024-06-10 RX ORDER — DEXAMETHASONE SODIUM PHOSPHATE 10 MG/ML
INJECTION, SOLUTION INTRAMUSCULAR; INTRAVENOUS
Status: COMPLETED | OUTPATIENT
Start: 2024-06-10 | End: 2024-06-10

## 2024-06-10 RX ORDER — LIDOCAINE HYDROCHLORIDE 10 MG/ML
INJECTION, SOLUTION EPIDURAL; INFILTRATION; INTRACAUDAL; PERINEURAL
Status: COMPLETED | OUTPATIENT
Start: 2024-06-10 | End: 2024-06-10

## 2024-06-10 RX ADMIN — LIDOCAINE HYDROCHLORIDE 1 ML: 10 INJECTION, SOLUTION EPIDURAL; INFILTRATION; INTRACAUDAL; PERINEURAL at 10:08

## 2024-06-10 RX ADMIN — DEXAMETHASONE SODIUM PHOSPHATE 10 MG: 10 INJECTION, SOLUTION INTRAMUSCULAR; INTRAVENOUS at 10:09

## 2024-06-10 RX ADMIN — BUPIVACAINE HYDROCHLORIDE 1 ML: 2.5 INJECTION, SOLUTION EPIDURAL; INFILTRATION; INTRACAUDAL at 10:08

## 2024-06-10 ASSESSMENT — PAIN SCALES - GENERAL
PAINLEVEL: MODERATE PAIN (5)
PAINLEVEL: NO PAIN (1)

## 2024-06-10 NOTE — PATIENT INSTRUCTIONS
Follow-up visit with Dr. Brandon in 2-4 weeks to discuss injection outcome and determine care plan going forward.       DISCHARGE INSTRUCTIONS    During office hours (8:00 a.m.- 4:00 p.m.) questions or concerns may be answered  by calling [facility] Navigation Nurses at  [phone].  Messages received after hours will be returned the following business day.      In the case of an emergency, please dial 911 or seek assistance at the nearest Emergency Room/Urgent Care facility.     All Patients:    You may experience an increase in your symptoms for the first 2 days (It may take anywhere between 2 days- 2 weeks for the steroid to have maximum effect).    You may use ice on the injection site, as frequently as 20 minutes each hour if needed.    You may take your pain medicine.    You may continue taking your regular medication after your injection. If you have had a Medial Branch Block you may resume pain medication once your pain diary is completed.    You may shower. No swimming, tub bath or hot tub for 48 hours.  You may remove your bandaid/bandage as soon as you are home.    You may resume light activities, as tolerated.    Resume your usual diet as tolerated.    It is strongly advised that you do not drive for 1-3 hours post injection.    If you have had oral sedation:  Do not drive for 8 hours post injection.      If you have had IV sedation:  Do not drive for 24 hours post injection.  Do not operate hazardous machinery or make important personal/business decisions for 24 hours.      POSSIBLE STEROID SIDE EFFECTS (If steroid/cortisone was used for your procedure)    -If you experience these symptoms, it should only last for a short period    Swelling of the legs              Skin redness (flushing)     Mouth (oral) irritation   Blood sugar (glucose) levels            Sweats                    Mood changes  Headache  Sleeplessness  Weakened immune system for up to 14 days, which could increase the risk of  pete the COVID-19 virus and/or experiencing more severe symptoms of the disease, if exposed.  Decreased effectiveness of the flu vaccine if given within 2 weeks of the steroid.         POSSIBLE PROCEDURE SIDE EFFECTS  -Call the Spine Center if you are concerned  Increased Pain           Increased numbness/tingling      Nausea/Vomiting          Bruising/bleeding at site      Redness or swelling                                              Difficulty walking      Weakness           Fever greater than 100.5    *In the event of a severe headache after an epidural steroid injection that is relieved by lying down, please call the [facility] to speak with a clinical staff member*

## 2025-03-19 ENCOUNTER — PATIENT OUTREACH (OUTPATIENT)
Dept: CARE COORDINATION | Facility: CLINIC | Age: 45
End: 2025-03-19
Payer: COMMERCIAL

## 2025-04-02 ENCOUNTER — PATIENT OUTREACH (OUTPATIENT)
Dept: CARE COORDINATION | Facility: CLINIC | Age: 45
End: 2025-04-02
Payer: COMMERCIAL

## 2025-04-17 DIAGNOSIS — F32.1 MAJOR DEPRESSIVE DISORDER, SINGLE EPISODE, MODERATE (H): ICD-10-CM

## 2025-04-17 RX ORDER — VENLAFAXINE HYDROCHLORIDE 75 MG/1
225 CAPSULE, EXTENDED RELEASE ORAL DAILY
Qty: 270 CAPSULE | Refills: 3 | Status: SHIPPED | OUTPATIENT
Start: 2025-04-17

## 2025-05-18 ENCOUNTER — HEALTH MAINTENANCE LETTER (OUTPATIENT)
Age: 45
End: 2025-05-18